# Patient Record
Sex: FEMALE | Race: WHITE | NOT HISPANIC OR LATINO | Employment: OTHER | URBAN - METROPOLITAN AREA
[De-identification: names, ages, dates, MRNs, and addresses within clinical notes are randomized per-mention and may not be internally consistent; named-entity substitution may affect disease eponyms.]

---

## 2023-04-27 LAB
6-ACETYLMORPHINE: <25 NG/ML
7-AMINOCLONAZEPAM: <25 NG/ML
ALPHA-HYDROXYALPRAZOLAM: <25 NG/ML
ALPHA-HYDROXYMIDAZOLAM: <25 NG/ML
ALPRAZOLAM: <25 NG/ML
AMPHETAMINE (PRESENCE) IN URINE BY SCREEN METHOD: ABNORMAL
BARBITURATES PRESENCE IN URINE BY SCREEN METHOD: ABNORMAL
CANNABINOIDS IN URINE BY SCREEN METHOD: ABNORMAL
CHLORDIAZEPOXIDE: <25 NG/ML
CLONAZEPAM: <25 NG/ML
COCAINE (PRESENCE) IN URINE BY SCREEN METHOD: ABNORMAL
CODEINE: <50 NG/ML
CREATINE, URINE FOR DRUG: 254.1 MG/DL
DIAZEPAM: <25 NG/ML
DRUG SCREEN COMMENT URINE: ABNORMAL
EDDP: <25 NG/ML
FENTANYL CONFIRMATION, URINE: <2.5 NG/ML
HYDROCODONE: <25 NG/ML
HYDROMORPHONE: <25 NG/ML
LORAZEPAM: <25 NG/ML
METHADONE CONFIRMATION,URINE: <25 NG/ML
MIDAZOLAM: <25 NG/ML
MORPHINE URINE: <50 NG/ML
NORDIAZEPAM: <25 NG/ML
NORFENTANYL: <2.5 NG/ML
NORHYDROCODONE: <25 NG/ML
NOROXYCODONE: <25 NG/ML
O-DESMETHYLTRAMADOL: <50 NG/ML
OXAZEPAM: <25 NG/ML
OXYCODONE: <25 NG/ML
OXYMORPHONE: <25 NG/ML
PHENCYCLIDINE (PRESENCE) IN URINE BY SCREEN METHOD: ABNORMAL
TEMAZEPAM: <25 NG/ML
TRAMADOL: <50 NG/ML
ZOLPIDEM METABOLITE (ZCA): >1000 NG/ML
ZOLPIDEM: <25 NG/ML

## 2023-09-22 PROBLEM — M41.80 LEVOSCOLIOSIS: Status: ACTIVE | Noted: 2023-09-22

## 2023-09-22 PROBLEM — S46.819A STRAIN OF TRAPEZIUS MUSCLE: Status: ACTIVE | Noted: 2023-09-22

## 2023-09-22 PROBLEM — S39.012A LUMBOSACRAL STRAIN: Status: ACTIVE | Noted: 2023-09-22

## 2023-09-22 PROBLEM — I20.9 ANGINA PECTORIS (CMS-HCC): Status: ACTIVE | Noted: 2023-09-22

## 2023-09-22 PROBLEM — G47.8 SLEEP PARALYSIS: Status: ACTIVE | Noted: 2023-09-22

## 2023-09-22 PROBLEM — G47.00 INSOMNIA: Status: ACTIVE | Noted: 2023-09-22

## 2023-09-22 PROBLEM — M21.70 LEG LENGTH DISCREPANCY: Status: ACTIVE | Noted: 2023-09-22

## 2023-09-22 PROBLEM — M51.26 LUMBAR DISC HERNIATION: Status: ACTIVE | Noted: 2023-09-22

## 2023-09-22 PROBLEM — G89.29 OTHER CHRONIC PAIN: Status: ACTIVE | Noted: 2023-09-22

## 2023-09-22 PROBLEM — F43.9 STRESS: Status: ACTIVE | Noted: 2023-09-22

## 2023-09-22 PROBLEM — M24.559 HIP FLEXOR TIGHTNESS: Status: ACTIVE | Noted: 2023-09-22

## 2023-09-22 PROBLEM — M16.0 OSTEOARTHRITIS OF BOTH HIPS: Status: ACTIVE | Noted: 2023-09-22

## 2023-09-22 PROBLEM — M51.9 DISORDER OF INTERVERTEBRAL DISC OF LUMBAR SPINE: Status: ACTIVE | Noted: 2023-09-22

## 2023-09-22 PROBLEM — R06.02 SHORTNESS OF BREATH: Status: ACTIVE | Noted: 2023-09-22

## 2023-09-22 PROBLEM — N80.9 ENDOMETRIOSIS: Status: ACTIVE | Noted: 2023-09-22

## 2023-09-22 PROBLEM — I42.9 CARDIOMYOPATHY (MULTI): Status: ACTIVE | Noted: 2023-09-22

## 2023-09-22 PROBLEM — M51.369 DEGENERATIVE DISC DISEASE, LUMBAR: Status: ACTIVE | Noted: 2023-09-22

## 2023-09-22 PROBLEM — K52.9 COLITIS: Status: ACTIVE | Noted: 2023-09-22

## 2023-09-22 PROBLEM — K57.32 DIVERTICULITIS LARGE INTESTINE W/O PERFORATION OR ABSCESS W/O BLEEDING: Status: ACTIVE | Noted: 2023-09-22

## 2023-09-22 PROBLEM — S63.509A SPRAIN OF WRIST: Status: ACTIVE | Noted: 2023-09-22

## 2023-09-22 PROBLEM — G25.81 RESTLESS LEG SYNDROME: Status: ACTIVE | Noted: 2023-09-22

## 2023-09-22 PROBLEM — E78.49 OTHER HYPERLIPIDEMIA: Status: ACTIVE | Noted: 2023-09-22

## 2023-09-22 PROBLEM — M51.36 DEGENERATIVE DISC DISEASE, LUMBAR: Status: ACTIVE | Noted: 2023-09-22

## 2023-09-22 PROBLEM — G47.26 SHIFT WORK SLEEP DISORDER: Status: ACTIVE | Noted: 2023-09-22

## 2023-09-22 PROBLEM — G44.89 OTHER HEADACHE SYNDROME: Status: ACTIVE | Noted: 2023-09-22

## 2023-09-22 PROBLEM — R79.0 LOW FERRITIN: Status: ACTIVE | Noted: 2023-09-22

## 2023-09-22 PROBLEM — F43.20 ADJUSTMENT DISORDER: Status: ACTIVE | Noted: 2023-09-22

## 2023-09-22 PROBLEM — M21.079 EVERSION DEFORMITY OF FOOT: Status: ACTIVE | Noted: 2023-09-22

## 2023-09-22 PROBLEM — N91.1 SECONDARY AMENORRHEA: Status: ACTIVE | Noted: 2023-09-22

## 2023-09-22 PROBLEM — G43.909 MIGRAINE: Status: ACTIVE | Noted: 2023-09-22

## 2023-09-22 PROBLEM — M62.89 HAMSTRING TIGHTNESS: Status: ACTIVE | Noted: 2023-09-22

## 2023-09-22 PROBLEM — R41.840 DIFFICULTY CONCENTRATING: Status: ACTIVE | Noted: 2023-09-22

## 2023-09-22 PROBLEM — E55.9 VITAMIN D DEFICIENCY: Status: ACTIVE | Noted: 2023-09-22

## 2023-09-22 PROBLEM — K58.0 IRRITABLE BOWEL SYNDROME WITH DIARRHEA: Status: ACTIVE | Noted: 2023-09-22

## 2023-09-22 PROBLEM — R00.2 PALPITATIONS: Status: ACTIVE | Noted: 2023-09-22

## 2023-09-22 PROBLEM — M54.10 RADICULITIS: Status: ACTIVE | Noted: 2023-09-22

## 2023-09-22 PROBLEM — B00.9 HERPESVIRUS INFECTION: Status: ACTIVE | Noted: 2023-09-22

## 2023-09-22 PROBLEM — T17.208A FOREIGN BODY IN PHARYNX: Status: ACTIVE | Noted: 2023-09-22

## 2023-09-22 PROBLEM — F41.9 ANXIETY: Status: ACTIVE | Noted: 2023-09-22

## 2023-09-22 PROBLEM — G54.9 NERVE ROOT COMPRESSION: Status: ACTIVE | Noted: 2023-09-22

## 2023-09-22 PROBLEM — S20.219A CONTUSION OF CHEST: Status: ACTIVE | Noted: 2023-09-22

## 2023-09-22 PROBLEM — B94.8 SEQUELAE OF OTHER SPECIFIED INFECTIOUS AND PARASITIC DISEASES: Status: ACTIVE | Noted: 2023-09-22

## 2023-09-22 RX ORDER — ONDANSETRON 4 MG/1
TABLET, ORALLY DISINTEGRATING ORAL
COMMUNITY
Start: 2022-11-03 | End: 2023-11-07 | Stop reason: ALTCHOICE

## 2023-09-22 RX ORDER — AMITRIPTYLINE HYDROCHLORIDE 10 MG/1
1-2 TABLET, FILM COATED ORAL NIGHTLY PRN
COMMUNITY
End: 2023-11-07 | Stop reason: SDUPTHER

## 2023-09-22 RX ORDER — CHOLECALCIFEROL (VITAMIN D3) 50 MCG
1 TABLET ORAL DAILY
COMMUNITY

## 2023-09-22 RX ORDER — LIDOCAINE 50 MG/G
PATCH TOPICAL DAILY
COMMUNITY
Start: 2021-06-11

## 2023-09-22 RX ORDER — TALC
1 POWDER (GRAM) TOPICAL NIGHTLY PRN
COMMUNITY

## 2023-09-22 RX ORDER — VITAMIN B COMPLEX
1 CAPSULE ORAL DAILY
COMMUNITY

## 2023-09-22 RX ORDER — SUCRALFATE 1 G/10ML
30 SUSPENSION ORAL 4 TIMES DAILY PRN
COMMUNITY

## 2023-09-22 RX ORDER — CLINDAMYCIN PHOSPHATE 1 G/10ML
1 GEL TOPICAL DAILY
COMMUNITY
Start: 2020-01-06

## 2023-09-22 RX ORDER — SPIRONOLACTONE 50 MG/1
3 TABLET, FILM COATED ORAL DAILY
COMMUNITY
End: 2023-11-07 | Stop reason: SDUPTHER

## 2023-09-22 RX ORDER — ACETYLCYSTEINE 600 MG
CAPSULE ORAL
COMMUNITY

## 2023-09-22 RX ORDER — DICYCLOMINE HYDROCHLORIDE 10 MG/1
2 CAPSULE ORAL 3 TIMES DAILY
COMMUNITY

## 2023-09-22 RX ORDER — GABAPENTIN 300 MG/1
1 CAPSULE ORAL 3 TIMES DAILY
COMMUNITY
End: 2023-12-01 | Stop reason: SDUPTHER

## 2023-09-22 RX ORDER — ZOLPIDEM TARTRATE 10 MG/1
1 TABLET ORAL NIGHTLY PRN
COMMUNITY
Start: 2022-08-17 | End: 2023-10-18 | Stop reason: SDUPTHER

## 2023-10-05 ENCOUNTER — PHARMACY VISIT (OUTPATIENT)
Dept: PHARMACY | Facility: CLINIC | Age: 38
End: 2023-10-05
Payer: COMMERCIAL

## 2023-10-05 PROCEDURE — RXMED WILLOW AMBULATORY MEDICATION CHARGE

## 2023-10-18 ENCOUNTER — PHARMACY VISIT (OUTPATIENT)
Dept: PHARMACY | Facility: CLINIC | Age: 38
End: 2023-10-18
Payer: COMMERCIAL

## 2023-10-18 DIAGNOSIS — F51.01 PRIMARY INSOMNIA: ICD-10-CM

## 2023-10-18 PROCEDURE — RXMED WILLOW AMBULATORY MEDICATION CHARGE

## 2023-10-18 RX ORDER — ZOLPIDEM TARTRATE 10 MG/1
10 TABLET ORAL NIGHTLY PRN
Qty: 30 TABLET | Refills: 0 | Status: SHIPPED | OUTPATIENT
Start: 2023-10-18 | End: 2023-11-01 | Stop reason: SDUPTHER

## 2023-10-18 NOTE — TELEPHONE ENCOUNTER
Patient called requesting refill of Ambien. FUV 10/23/23 - will not have enough medication until then.     #30 for 30 days sent to pharmacy on file.

## 2023-10-19 RX ORDER — ZOLPIDEM TARTRATE 10 MG/1
10 TABLET ORAL NIGHTLY PRN
Qty: 30 TABLET | Refills: 2 | OUTPATIENT
Start: 2023-10-19 | End: 2024-04-16

## 2023-10-23 ENCOUNTER — APPOINTMENT (OUTPATIENT)
Dept: SLEEP MEDICINE | Facility: CLINIC | Age: 38
End: 2023-10-23
Payer: COMMERCIAL

## 2023-10-24 ENCOUNTER — TELEPHONE (OUTPATIENT)
Dept: PRIMARY CARE | Facility: CLINIC | Age: 38
End: 2023-10-24
Payer: COMMERCIAL

## 2023-10-24 NOTE — TELEPHONE ENCOUNTER
PT was scheduled for physical on 10/24/2023, was marked as a no show as PT was4 minutes late for appointment. PT has rescheduled her physical for 11/7/2023, but she needs a form for her employer stating she is exempt from the flu shot. Please advise. [phone]

## 2023-10-31 NOTE — PROGRESS NOTES
"This is a 38 year old female for ROUTINE MEDICAL        Subjective   Leeann Mark is a 38 y.o. female who presents for Annual Exam.    HPI:        Review of systems is essentially negative for all systems except for any identified issues in HPI above.    Objective     /60   Pulse 91   Temp 36.7 °C (98 °F)   Ht 1.6 m (5' 3\")   Wt 70.8 kg (156 lb)   SpO2 98%   BMI 27.63 kg/m²      COMPLETE PHYSICAL EXAM    GENERAL           General Appearance: pleasant, well-appearing, well-developed, well-hydrated, well-nourished, well-groomed, .        HEENT           NECK supple, Neg for adneopathy no thyroid enlargement or nodules, Oropharynx normal no exudates.        EYES           Pupils: PERRLA, no photophobia.        HEART           Rate and Rhythm regular rate and rhythm. Heart sounds: normal S1S2. Murmurs: none.        LUNGS           Effort: Normal chest wall, no pectus, Normal air entry all fields, Clear to IPPA, RR<16 with no use of accessory muscles.        BREASTS           Bilaterally: no masses, no nipple retraction, no nipple discharge, no abnormal skin changes. Axilla: no lymphadenopathy.        BACK           General: unremarkable, no spinal tenderness or rashes.        ABDOMEN           General: Normal to inspection, neg for LKKS or masses and BSs heard in all quadrants       LYMPHATICS           Cervical: none. Axillary: none.        MUSCULOSKELETAL           gross abnormalities no gross abnormalities, no joint redness or swelling.        EXTREMITIES           Varicose veins: not present. Pulses: 2+ bilateral. Clubbing: none. Cyanosis: no.        NEUROLOGICAL           Orientation: alert and oriented x 3. Grossly normal: yes. Plantars: downgoing bilaterally. Muscle Bulk: normal . Cranial Nerves: CN's II-XII grossly intact.        PSYCHOLOGY           Affect: appropriate. Mood: pleasant.     Assessment/Plan   Problem List Items Addressed This Visit    None  Visit Diagnoses       Routine medical exam  "   -  Primary    Screening, lipid        Health counseling        Immunization counseling                FOLLOW UP:   YEARLY for ROUTINE MEDICAL and PRN for other issues as discussed in visit         Sil Hackett M.D. ROS is NEG for HEADACHE, NAUSEA, VOMITING, DIARRHEA, CHEST PAIN, SOB, and BLEEDING and as further REVIEWED BELOW.

## 2023-11-01 ENCOUNTER — PHARMACY VISIT (OUTPATIENT)
Dept: PHARMACY | Facility: CLINIC | Age: 38
End: 2023-11-01
Payer: COMMERCIAL

## 2023-11-01 DIAGNOSIS — F51.01 PRIMARY INSOMNIA: ICD-10-CM

## 2023-11-01 PROCEDURE — RXMED WILLOW AMBULATORY MEDICATION CHARGE

## 2023-11-02 RX ORDER — ZOLPIDEM TARTRATE 10 MG/1
10 TABLET ORAL NIGHTLY PRN
Qty: 30 TABLET | Refills: 0 | Status: SHIPPED | OUTPATIENT
Start: 2023-11-02 | End: 2023-11-30 | Stop reason: SDUPTHER

## 2023-11-07 ENCOUNTER — OFFICE VISIT (OUTPATIENT)
Dept: PRIMARY CARE | Facility: CLINIC | Age: 38
End: 2023-11-07
Payer: COMMERCIAL

## 2023-11-07 ENCOUNTER — PHARMACY VISIT (OUTPATIENT)
Dept: PHARMACY | Facility: CLINIC | Age: 38
End: 2023-11-07
Payer: COMMERCIAL

## 2023-11-07 VITALS
BODY MASS INDEX: 27.64 KG/M2 | DIASTOLIC BLOOD PRESSURE: 60 MMHG | WEIGHT: 156 LBS | OXYGEN SATURATION: 98 % | TEMPERATURE: 98 F | HEIGHT: 63 IN | SYSTOLIC BLOOD PRESSURE: 118 MMHG | HEART RATE: 91 BPM

## 2023-11-07 DIAGNOSIS — Z71.85 IMMUNIZATION COUNSELING: ICD-10-CM

## 2023-11-07 DIAGNOSIS — Z00.00 ROUTINE MEDICAL EXAM: Primary | ICD-10-CM

## 2023-11-07 DIAGNOSIS — Z13.220 SCREENING, LIPID: ICD-10-CM

## 2023-11-07 DIAGNOSIS — L70.9 ACNE, UNSPECIFIED ACNE TYPE: ICD-10-CM

## 2023-11-07 DIAGNOSIS — Z71.9 HEALTH COUNSELING: ICD-10-CM

## 2023-11-07 PROCEDURE — RXMED WILLOW AMBULATORY MEDICATION CHARGE

## 2023-11-07 PROCEDURE — 99395 PREV VISIT EST AGE 18-39: CPT | Performed by: FAMILY MEDICINE

## 2023-11-07 PROCEDURE — 1036F TOBACCO NON-USER: CPT | Performed by: FAMILY MEDICINE

## 2023-11-07 RX ORDER — SPIRONOLACTONE 50 MG/1
150 TABLET, FILM COATED ORAL DAILY
Qty: 90 TABLET | Refills: 0 | Status: SHIPPED | OUTPATIENT
Start: 2023-11-07 | End: 2023-11-30 | Stop reason: SDUPTHER

## 2023-11-07 ASSESSMENT — PATIENT HEALTH QUESTIONNAIRE - PHQ9
SUM OF ALL RESPONSES TO PHQ9 QUESTIONS 1 AND 2: 0
1. LITTLE INTEREST OR PLEASURE IN DOING THINGS: NOT AT ALL
2. FEELING DOWN, DEPRESSED OR HOPELESS: NOT AT ALL

## 2023-11-07 ASSESSMENT — PAIN SCALES - GENERAL: PAINLEVEL: 3

## 2023-11-13 ENCOUNTER — PHARMACY VISIT (OUTPATIENT)
Dept: PHARMACY | Facility: CLINIC | Age: 38
End: 2023-11-13
Payer: COMMERCIAL

## 2023-11-13 PROCEDURE — RXMED WILLOW AMBULATORY MEDICATION CHARGE

## 2023-11-17 ENCOUNTER — LAB (OUTPATIENT)
Dept: LAB | Facility: LAB | Age: 38
End: 2023-11-17
Payer: COMMERCIAL

## 2023-11-17 DIAGNOSIS — Z00.00 ROUTINE MEDICAL EXAM: ICD-10-CM

## 2023-11-17 DIAGNOSIS — Z13.220 SCREENING, LIPID: ICD-10-CM

## 2023-11-17 LAB
ALBUMIN SERPL-MCNC: 4.6 G/DL (ref 3.5–5)
ALP BLD-CCNC: 84 U/L (ref 35–125)
ALT SERPL-CCNC: 19 U/L (ref 5–40)
ANION GAP SERPL CALC-SCNC: 14 MMOL/L
APPEARANCE UR: CLEAR
AST SERPL-CCNC: 18 U/L (ref 5–40)
BACTERIA #/AREA URNS AUTO: ABNORMAL /HPF
BILIRUB SERPL-MCNC: 0.4 MG/DL (ref 0.1–1.2)
BILIRUB UR STRIP.AUTO-MCNC: NEGATIVE MG/DL
BUN SERPL-MCNC: 8 MG/DL (ref 8–25)
CALCIUM SERPL-MCNC: 9.6 MG/DL (ref 8.5–10.4)
CHLORIDE SERPL-SCNC: 101 MMOL/L (ref 97–107)
CHOLEST SERPL-MCNC: 177 MG/DL (ref 133–200)
CHOLEST/HDLC SERPL: 3.1 {RATIO}
CO2 SERPL-SCNC: 24 MMOL/L (ref 24–31)
COLOR UR: YELLOW
CREAT SERPL-MCNC: 0.8 MG/DL (ref 0.4–1.6)
ERYTHROCYTE [DISTWIDTH] IN BLOOD BY AUTOMATED COUNT: 12 % (ref 11.5–14.5)
GFR SERPL CREATININE-BSD FRML MDRD: >90 ML/MIN/1.73M*2
GLUCOSE SERPL-MCNC: 96 MG/DL (ref 65–99)
GLUCOSE UR STRIP.AUTO-MCNC: NORMAL MG/DL
HCT VFR BLD AUTO: 40.2 % (ref 36–46)
HDLC SERPL-MCNC: 57 MG/DL
HGB BLD-MCNC: 13.4 G/DL (ref 12–16)
HOLD SPECIMEN: NORMAL
KETONES UR STRIP.AUTO-MCNC: NEGATIVE MG/DL
LDLC SERPL CALC-MCNC: 103 MG/DL (ref 65–130)
LEUKOCYTE ESTERASE UR QL STRIP.AUTO: NEGATIVE
MCH RBC QN AUTO: 29.1 PG (ref 26–34)
MCHC RBC AUTO-ENTMCNC: 33.3 G/DL (ref 32–36)
MCV RBC AUTO: 87 FL (ref 80–100)
NITRITE UR QL STRIP.AUTO: NEGATIVE
NRBC BLD-RTO: 0 /100 WBCS (ref 0–0)
PH UR STRIP.AUTO: 5 [PH]
PLATELET # BLD AUTO: 298 X10*3/UL (ref 150–450)
POTASSIUM SERPL-SCNC: 4.1 MMOL/L (ref 3.4–5.1)
PROT SERPL-MCNC: 7.3 G/DL (ref 5.9–7.9)
PROT UR STRIP.AUTO-MCNC: NEGATIVE MG/DL
RBC # BLD AUTO: 4.6 X10*6/UL (ref 4–5.2)
RBC # UR STRIP.AUTO: ABNORMAL /UL
RBC #/AREA URNS AUTO: ABNORMAL /HPF
SODIUM SERPL-SCNC: 139 MMOL/L (ref 133–145)
SP GR UR STRIP.AUTO: 1.02
SQUAMOUS #/AREA URNS AUTO: ABNORMAL /HPF
TRIGL SERPL-MCNC: 86 MG/DL (ref 40–150)
UROBILINOGEN UR STRIP.AUTO-MCNC: NORMAL MG/DL
WBC # BLD AUTO: 8.5 X10*3/UL (ref 4.4–11.3)
WBC #/AREA URNS AUTO: ABNORMAL /HPF

## 2023-11-17 PROCEDURE — 80061 LIPID PANEL: CPT

## 2023-11-17 PROCEDURE — 81001 URINALYSIS AUTO W/SCOPE: CPT

## 2023-11-17 PROCEDURE — 80053 COMPREHEN METABOLIC PANEL: CPT

## 2023-11-17 PROCEDURE — 85027 COMPLETE CBC AUTOMATED: CPT

## 2023-11-17 PROCEDURE — 36415 COLL VENOUS BLD VENIPUNCTURE: CPT

## 2023-11-20 ENCOUNTER — TELEPHONE (OUTPATIENT)
Dept: PRIMARY CARE | Facility: CLINIC | Age: 38
End: 2023-11-20
Payer: COMMERCIAL

## 2023-11-20 NOTE — TELEPHONE ENCOUNTER
Pt left VM stating that she received a message on  [facility] to call the office to go over recent urine test results.

## 2023-11-30 ENCOUNTER — PHARMACY VISIT (OUTPATIENT)
Dept: PHARMACY | Facility: CLINIC | Age: 38
End: 2023-11-30
Payer: COMMERCIAL

## 2023-11-30 DIAGNOSIS — G89.29 OTHER CHRONIC PAIN: Primary | ICD-10-CM

## 2023-11-30 DIAGNOSIS — L70.9 ACNE, UNSPECIFIED ACNE TYPE: ICD-10-CM

## 2023-11-30 DIAGNOSIS — F51.01 PRIMARY INSOMNIA: ICD-10-CM

## 2023-11-30 PROCEDURE — RXMED WILLOW AMBULATORY MEDICATION CHARGE

## 2023-11-30 RX ORDER — ZOLPIDEM TARTRATE 10 MG/1
10 TABLET ORAL NIGHTLY PRN
Qty: 30 TABLET | Refills: 0 | Status: SHIPPED | OUTPATIENT
Start: 2023-11-30 | End: 2024-01-05 | Stop reason: SDUPTHER

## 2023-11-30 RX ORDER — SPIRONOLACTONE 50 MG/1
150 TABLET, FILM COATED ORAL DAILY
Qty: 90 TABLET | Refills: 0 | Status: SHIPPED | OUTPATIENT
Start: 2023-11-30 | End: 2023-12-26 | Stop reason: SDUPTHER

## 2023-11-30 NOTE — TELEPHONE ENCOUNTER
"ANN CALLED REQUESTING GABAPENTIN REFILL 300MGM TID.  LAST VISIT 9/18/23 WITH NO FOLLOW UP APPT. SCHEDULED.  WHEN I ATTEMPTED TO GET THE PT TO MAKE A FOLLOW UP APPOINTMENT SHE STATED, \"DR. THOMAS SAID  HE WOULD SEE ME IN 1 YEAR\". NO APPOINTMENT MADE.  PLEASE ADVISE  "

## 2023-12-01 ENCOUNTER — PHARMACY VISIT (OUTPATIENT)
Dept: PHARMACY | Facility: CLINIC | Age: 38
End: 2023-12-01
Payer: COMMERCIAL

## 2023-12-01 DIAGNOSIS — Z76.0 MEDICATION REFILL: ICD-10-CM

## 2023-12-01 PROCEDURE — RXMED WILLOW AMBULATORY MEDICATION CHARGE

## 2023-12-01 RX ORDER — GABAPENTIN 300 MG/1
300 CAPSULE ORAL 3 TIMES DAILY
Qty: 90 CAPSULE | Refills: 0 | Status: SHIPPED | OUTPATIENT
Start: 2023-12-01 | End: 2023-12-31

## 2023-12-01 RX ORDER — GABAPENTIN 300 MG/1
300 CAPSULE ORAL 3 TIMES DAILY
Qty: 90 CAPSULE | Refills: 2 | Status: SHIPPED | OUTPATIENT
Start: 2023-12-01 | End: 2024-05-23 | Stop reason: SDUPTHER

## 2023-12-01 RX ORDER — GABAPENTIN 300 MG/1
300 CAPSULE ORAL 3 TIMES DAILY
Qty: 90 CAPSULE | Refills: 4 | Status: SHIPPED | OUTPATIENT
Start: 2023-12-01 | End: 2024-11-30

## 2023-12-07 PROCEDURE — RXMED WILLOW AMBULATORY MEDICATION CHARGE

## 2023-12-09 PROCEDURE — RXMED WILLOW AMBULATORY MEDICATION CHARGE

## 2023-12-11 ENCOUNTER — PHARMACY VISIT (OUTPATIENT)
Dept: PHARMACY | Facility: CLINIC | Age: 38
End: 2023-12-11
Payer: COMMERCIAL

## 2023-12-26 DIAGNOSIS — L70.9 ACNE, UNSPECIFIED ACNE TYPE: ICD-10-CM

## 2023-12-26 PROCEDURE — RXMED WILLOW AMBULATORY MEDICATION CHARGE

## 2023-12-26 RX ORDER — SPIRONOLACTONE 50 MG/1
150 TABLET, FILM COATED ORAL DAILY
Qty: 90 TABLET | Refills: 0 | Status: SHIPPED | OUTPATIENT
Start: 2023-12-26 | End: 2024-02-21 | Stop reason: SDUPTHER

## 2023-12-27 ENCOUNTER — PHARMACY VISIT (OUTPATIENT)
Dept: PHARMACY | Facility: CLINIC | Age: 38
End: 2023-12-27
Payer: COMMERCIAL

## 2024-01-05 DIAGNOSIS — F51.01 PRIMARY INSOMNIA: ICD-10-CM

## 2024-01-08 ENCOUNTER — PHARMACY VISIT (OUTPATIENT)
Dept: PHARMACY | Facility: CLINIC | Age: 39
End: 2024-01-08
Payer: COMMERCIAL

## 2024-01-08 PROCEDURE — RXMED WILLOW AMBULATORY MEDICATION CHARGE

## 2024-01-08 RX ORDER — ZOLPIDEM TARTRATE 10 MG/1
10 TABLET ORAL NIGHTLY PRN
Qty: 30 TABLET | Refills: 0 | Status: SHIPPED | OUTPATIENT
Start: 2024-01-08 | End: 2024-02-06 | Stop reason: SDUPTHER

## 2024-01-22 NOTE — PROGRESS NOTES
This is a 38 year old female for FU CONDITIONS and COMPLETION/UPDATE [facility] PPWK and doing reasonably well in care of PAIN MGT and SLEEP MEDICINE for controlled substances.    Some weight gain and had TSH wnl recenlty.      ROS is NEG for HEADACHE, NAUSEA, VOMITING, DIARRHEA, CHEST PAIN, SOB, and BLEEDING and as further REVIEWED BELOW.    Subjective   Leeann Mark is a 38 y.o. female who presents for FU CHRONIC CONDITIONS and [facility]    HPI:    See above    Review of systems is essentially negative for all systems except for any identified issues in HPI above.    Objective     /80   Pulse 94   Temp 36.3 °C (97.4 °F)   Wt 70.8 kg (156 lb)   SpO2 98%   BMI 27.63 kg/m²      Physical Examination:       GENERAL           General Appearance: well-appearing, well-developed, well-hydrated, well-nourished, no acute distress.        HEENT           NECK supple, no masses or thyromegaly, no carotid bruit.        EYES           Extraocular Movements: normal, bilateral eyes SAMIR, no conjunctival injection.        HEART           Rate and Rhythm regular rate and rhythm. Heart sounds: normal S1S2, no S3 or S4. Murmurs: none.        CHEST           Breath sounds: Clear to IPPA, RR<16 no use of accessory muscles.        ABDOMEN           General: Neg for LKKS or masses, no scleral icterus or jaundice.        MUSCULOSKELETAL           Joints Demonstration: Neg for erythema, swelling or joint deformities. gross abnormalities no gross abnormalities.        EXTREMITIES           Lower Extremities: Neg for cyanosis, clubbing or edema.       Assessment/Plan   Problem List Items Addressed This Visit    None  Visit Diagnoses       Immunization counseling    -  Primary    Health counseling                FOLLOW UP:  PRN and as specified above         Sil Hackett M.D.

## 2024-01-23 PROCEDURE — RXMED WILLOW AMBULATORY MEDICATION CHARGE

## 2024-01-26 ENCOUNTER — PHARMACY VISIT (OUTPATIENT)
Dept: PHARMACY | Facility: CLINIC | Age: 39
End: 2024-01-26
Payer: COMMERCIAL

## 2024-01-26 ENCOUNTER — OFFICE VISIT (OUTPATIENT)
Dept: PRIMARY CARE | Facility: CLINIC | Age: 39
End: 2024-01-26
Payer: COMMERCIAL

## 2024-01-26 VITALS
WEIGHT: 156 LBS | TEMPERATURE: 97.4 F | OXYGEN SATURATION: 98 % | BODY MASS INDEX: 27.63 KG/M2 | DIASTOLIC BLOOD PRESSURE: 80 MMHG | SYSTOLIC BLOOD PRESSURE: 126 MMHG | HEART RATE: 94 BPM

## 2024-01-26 DIAGNOSIS — Z71.85 IMMUNIZATION COUNSELING: Primary | ICD-10-CM

## 2024-01-26 DIAGNOSIS — Z71.9 HEALTH COUNSELING: ICD-10-CM

## 2024-01-26 PROCEDURE — 1036F TOBACCO NON-USER: CPT | Performed by: FAMILY MEDICINE

## 2024-01-26 PROCEDURE — 99213 OFFICE O/P EST LOW 20 MIN: CPT | Performed by: FAMILY MEDICINE

## 2024-01-26 RX ORDER — HYDROXYZINE PAMOATE 50 MG/1
CAPSULE ORAL
COMMUNITY
End: 2024-02-19 | Stop reason: ALTCHOICE

## 2024-01-26 ASSESSMENT — PATIENT HEALTH QUESTIONNAIRE - PHQ9
SUM OF ALL RESPONSES TO PHQ9 QUESTIONS 1 AND 2: 0
2. FEELING DOWN, DEPRESSED OR HOPELESS: NOT AT ALL
1. LITTLE INTEREST OR PLEASURE IN DOING THINGS: NOT AT ALL

## 2024-01-26 ASSESSMENT — PAIN SCALES - GENERAL: PAINLEVEL: 3

## 2024-02-06 DIAGNOSIS — F51.01 PRIMARY INSOMNIA: ICD-10-CM

## 2024-02-07 ENCOUNTER — PHARMACY VISIT (OUTPATIENT)
Dept: PHARMACY | Facility: CLINIC | Age: 39
End: 2024-02-07
Payer: COMMERCIAL

## 2024-02-07 PROCEDURE — RXMED WILLOW AMBULATORY MEDICATION CHARGE

## 2024-02-07 RX ORDER — ZOLPIDEM TARTRATE 10 MG/1
10 TABLET ORAL NIGHTLY PRN
Qty: 30 TABLET | Refills: 0 | Status: SHIPPED | OUTPATIENT
Start: 2024-02-07 | End: 2024-02-19 | Stop reason: SDUPTHER

## 2024-02-19 ENCOUNTER — LAB (OUTPATIENT)
Dept: LAB | Facility: LAB | Age: 39
End: 2024-02-19
Payer: COMMERCIAL

## 2024-02-19 ENCOUNTER — OFFICE VISIT (OUTPATIENT)
Dept: SLEEP MEDICINE | Facility: CLINIC | Age: 39
End: 2024-02-19
Payer: COMMERCIAL

## 2024-02-19 VITALS
DIASTOLIC BLOOD PRESSURE: 84 MMHG | OXYGEN SATURATION: 97 % | HEART RATE: 91 BPM | WEIGHT: 154 LBS | BODY MASS INDEX: 27.29 KG/M2 | HEIGHT: 63 IN | SYSTOLIC BLOOD PRESSURE: 116 MMHG

## 2024-02-19 DIAGNOSIS — F51.01 PRIMARY INSOMNIA: ICD-10-CM

## 2024-02-19 DIAGNOSIS — G47.26 SHIFT WORK SLEEP DISORDER: ICD-10-CM

## 2024-02-19 DIAGNOSIS — Z79.899 MEDICATION MANAGEMENT: Primary | ICD-10-CM

## 2024-02-19 DIAGNOSIS — Z79.899 MEDICATION MANAGEMENT: ICD-10-CM

## 2024-02-19 LAB
AMPHETAMINES UR QL SCN: NORMAL
BARBITURATES UR QL SCN: NORMAL
BENZODIAZ UR QL SCN: NORMAL
BZE UR QL SCN: NORMAL
CANNABINOIDS UR QL SCN: NORMAL
FENTANYL+NORFENTANYL UR QL SCN: NORMAL
OPIATES UR QL SCN: NORMAL
OXYCODONE+OXYMORPHONE UR QL SCN: NORMAL
PCP UR QL SCN: NORMAL

## 2024-02-19 PROCEDURE — 80368 SEDATIVE HYPNOTICS: CPT

## 2024-02-19 PROCEDURE — 80307 DRUG TEST PRSMV CHEM ANLYZR: CPT

## 2024-02-19 PROCEDURE — 1036F TOBACCO NON-USER: CPT | Performed by: INTERNAL MEDICINE

## 2024-02-19 PROCEDURE — 99213 OFFICE O/P EST LOW 20 MIN: CPT | Performed by: INTERNAL MEDICINE

## 2024-02-19 RX ORDER — ZOLPIDEM TARTRATE 10 MG/1
10 TABLET ORAL NIGHTLY PRN
Qty: 30 TABLET | Refills: 2 | Status: SHIPPED | OUTPATIENT
Start: 2024-02-19 | End: 2024-05-23 | Stop reason: SDUPTHER

## 2024-02-19 ASSESSMENT — SLEEP AND FATIGUE QUESTIONNAIRES
HOW LIKELY ARE YOU TO NOD OFF OR FALL ASLEEP WHILE SITTING QUIETLY AFTER LUNCH WITHOUT ALCOHOL: WOULD NEVER DOZE
HOW LIKELY ARE YOU TO NOD OFF OR FALL ASLEEP WHILE SITTING AND TALKING TO SOMEONE: WOULD NEVER DOZE
ESS-CHAD TOTAL SCORE: 2
HOW LIKELY ARE YOU TO NOD OFF OR FALL ASLEEP WHEN YOU ARE A PASSENGER IN A CAR FOR AN HOUR WITHOUT A BREAK: SLIGHT CHANCE OF DOZING
HOW LIKELY ARE YOU TO NOD OFF OR FALL ASLEEP IN A CAR, WHILE STOPPED FOR A FEW MINUTES IN TRAFFIC: WOULD NEVER DOZE
HOW LIKELY ARE YOU TO NOD OFF OR FALL ASLEEP WHILE LYING DOWN TO REST IN THE AFTERNOON WHEN CIRCUMSTANCES PERMIT: SLIGHT CHANCE OF DOZING
SITING INACTIVE IN A PUBLIC PLACE LIKE A CLASS ROOM OR A MOVIE THEATER: WOULD NEVER DOZE
HOW LIKELY ARE YOU TO NOD OFF OR FALL ASLEEP WHILE WATCHING TV: WOULD NEVER DOZE
HOW LIKELY ARE YOU TO NOD OFF OR FALL ASLEEP WHILE SITTING AND READING: WOULD NEVER DOZE

## 2024-02-19 ASSESSMENT — PAIN SCALES - GENERAL: PAINLEVEL: 0-NO PAIN

## 2024-02-19 NOTE — PROGRESS NOTES
Subjective   Patient ID: Ann Mark is a 38 y.o. female who presents for Insomnia.  HPI    Prior Sleep History:  PRIMARY SNORING:  ANN had a negative HSAT, AHI 4.1. 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 Make sure your room is dark, quiet, cool and interruptions are eliminated. Avoid light in the mornings, wear dark sunglasses driving home. Expose yourself to bright light or daylight upon waking. Avoid caffeine 8 hours prior to sleeping. We will consider modafinil, a mild stimulant, if these strategies are not effective.    Current Sleep History:  OARRS:  Gerardo Dougherty MD on 2/19/2024  4:01 PM  I have personally reviewed the OARRS report for Ann Mark. I have considered the risks of abuse, dependence, addiction and diversion and I believe that it is clinically appropriate for Ann Mark to be prescribed this medication    Is the patient prescribed a combination of a benzodiazepine and opioid?  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    Last Urine Drug Screen / ordered today: Yes  Recent Results (from the past 8760 hour(s))   OPIATE/OPIOID/BENZO PRESCRIPTION COMPLIANCE    Collection Time: 04/24/23  4:31 PM   Result Value Ref Range    DRUG SCREEN COMMENT URINE SEE BELOW     Creatine, Urine 254.1 mg/dL    Amphetamine Screen, Urine PRESUMPTIVE NEGATIVE NEGATIVE    Barbiturate Screen, Urine PRESUMPTIVE NEGATIVE NEGATIVE    Cannabinoid  Screen, Urine PRESUMPTIVE NEGATIVE NEGATIVE    Cocaine Screen, Urine PRESUMPTIVE NEGATIVE NEGATIVE    PCP Screen, Urine PRESUMPTIVE NEGATIVE NEGATIVE    7-Aminoclonazepam <25 Cutoff <25 ng/mL    Alpha-Hydroxyalprazolam <25 Cutoff <25 ng/mL    Alpha-Hydroxymidazolam <25 Cutoff <25 ng/mL    Alprazolam <25 Cutoff <25 ng/mL    Chlordiazepoxide <25 Cutoff <25 ng/mL    Clonazepam <25 Cutoff <25 ng/mL    Diazepam <25 Cutoff <25 ng/mL    Lorazepam <25 Cutoff <25 ng/mL    Midazolam <25 Cutoff <25 ng/mL    Nordiazepam <25 Cutoff <25 ng/mL    Oxazepam <25 Cutoff <25 ng/mL    Temazepam <25 Cutoff <25 ng/mL    Zolpidem <25 Cutoff <25 ng/mL    Zolpidem Metabolite (ZCA) >1000 (A) Cutoff <25 ng/mL    6-Acetylmorphine <25 Cutoff <25 ng/mL    Codeine <50 Cutoff <50 ng/mL    Hydrocodone <25 Cutoff <25 ng/mL    Hydromorphone <25 Cutoff <25 ng/mL    Morphine Urine <50 Cutoff <50 ng/mL    Norhydrocodone <25 Cutoff <25 ng/mL    Noroxycodone <25 Cutoff <25 ng/mL    Oxycodone <25 Cutoff <25 ng/mL    Oxymorphone <25 Cutoff <25 ng/mL    Tramadol <50 Cutoff <50 ng/mL    O-Desmethyltramadol <50 Cutoff <50 ng/mL    Fentanyl <2.5 Cutoff<2.5 ng/mL    Norfentanyl <2.5 Cutoff<2.5 ng/mL    METHADONE CONFIRMATION,URINE <25 Cutoff <25 ng/mL    EDDP <25 Cutoff <25 ng/mL     Results are as expected.         Controlled Substance Agreement:  Date of the Last Agreement: 02/19/24   Reviewed Controlled Substance Agreement including but not limited to the benefits, risks, and alternatives to treatment with a Controlled Substance medication(s).    Sleep Aids:   What is the patient's goal of therapy? To decrease time to fall asleep due to shift work  Is this being achieved with current treatment? yes    Activities of Daily Living:   Is your overall impression that this patient is benefiting (symptom reduction outweighs side effects) from sleep aid therapy? Yes     1. Physical Functioning: Better  2. Family Relationship: Better  3. Social Relationship: Better  4.  "Mood: Better  5. Sleep Patterns: Better  6. Overall Function: Better       ESS: 2     Review of Systems  Review of systems negative except as per HPI  Objective   /84   Pulse 91   Ht 1.6 m (5' 3\")   Wt 69.9 kg (154 lb)   SpO2 97%   BMI 27.28 kg/m²    PREVIOUS WEIGHTS:  Wt Readings from Last 3 Encounters:   02/19/24 69.9 kg (154 lb)   01/26/24 70.8 kg (156 lb)   11/07/23 70.8 kg (156 lb)     Physical Exam  PHYSICAL EXAM: GENERAL: alert pleasant and cooperative no acute distress  PSYCH EXAM: alert,oriented, in NAD with a full range of affect, normal behavior and no psychotic features    Lab Results   Component Value Date    IRON 128 06/11/2021    TIBC 354 06/11/2021    FERRITIN 24 06/11/2021        Assessment/Plan   Problem List Items Addressed This Visit             ICD-10-CM    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

## 2024-02-19 NOTE — ASSESSMENT & PLAN NOTE
Continues to work 7 PM - 7 AM and benefits from use of zolpidem to assist with getting to sleep on working days

## 2024-02-19 NOTE — ASSESSMENT & PLAN NOTE
Stable on zolpidem, continue current treatment plan. Reviewed controlled substance agreement and ordered UDS to update in records

## 2024-02-21 ENCOUNTER — PHARMACY VISIT (OUTPATIENT)
Dept: PHARMACY | Facility: CLINIC | Age: 39
End: 2024-02-21
Payer: COMMERCIAL

## 2024-02-21 DIAGNOSIS — L70.9 ACNE, UNSPECIFIED ACNE TYPE: ICD-10-CM

## 2024-02-21 PROCEDURE — RXMED WILLOW AMBULATORY MEDICATION CHARGE

## 2024-02-21 RX ORDER — SPIRONOLACTONE 50 MG/1
150 TABLET, FILM COATED ORAL DAILY
Qty: 90 TABLET | Refills: 0 | Status: SHIPPED | OUTPATIENT
Start: 2024-02-21 | End: 2024-03-15 | Stop reason: SDUPTHER

## 2024-02-24 LAB
ZOLPIDEM UR CFM-MCNC: >1000 NG/ML
ZOLPIDEM UR-MCNC: <25 NG/ML

## 2024-02-28 PROCEDURE — RXMED WILLOW AMBULATORY MEDICATION CHARGE

## 2024-03-04 ENCOUNTER — PHARMACY VISIT (OUTPATIENT)
Dept: PHARMACY | Facility: CLINIC | Age: 39
End: 2024-03-04
Payer: COMMERCIAL

## 2024-03-07 ENCOUNTER — PHARMACY VISIT (OUTPATIENT)
Dept: PHARMACY | Facility: CLINIC | Age: 39
End: 2024-03-07
Payer: COMMERCIAL

## 2024-03-07 PROCEDURE — RXMED WILLOW AMBULATORY MEDICATION CHARGE

## 2024-03-15 DIAGNOSIS — L70.9 ACNE, UNSPECIFIED ACNE TYPE: ICD-10-CM

## 2024-03-16 PROCEDURE — RXMED WILLOW AMBULATORY MEDICATION CHARGE

## 2024-03-18 ENCOUNTER — PHARMACY VISIT (OUTPATIENT)
Dept: PHARMACY | Facility: CLINIC | Age: 39
End: 2024-03-18
Payer: COMMERCIAL

## 2024-03-18 PROCEDURE — RXMED WILLOW AMBULATORY MEDICATION CHARGE

## 2024-03-18 RX ORDER — SPIRONOLACTONE 50 MG/1
150 TABLET, FILM COATED ORAL DAILY
Qty: 90 TABLET | Refills: 0 | Status: SHIPPED | OUTPATIENT
Start: 2024-03-18 | End: 2024-04-23 | Stop reason: SDUPTHER

## 2024-03-25 ENCOUNTER — APPOINTMENT (OUTPATIENT)
Dept: SLEEP MEDICINE | Facility: CLINIC | Age: 39
End: 2024-03-25
Payer: COMMERCIAL

## 2024-04-04 PROCEDURE — RXMED WILLOW AMBULATORY MEDICATION CHARGE

## 2024-04-05 ENCOUNTER — PHARMACY VISIT (OUTPATIENT)
Dept: PHARMACY | Facility: CLINIC | Age: 39
End: 2024-04-05
Payer: COMMERCIAL

## 2024-04-08 ENCOUNTER — PHARMACY VISIT (OUTPATIENT)
Dept: PHARMACY | Facility: CLINIC | Age: 39
End: 2024-04-08
Payer: COMMERCIAL

## 2024-04-08 PROCEDURE — RXMED WILLOW AMBULATORY MEDICATION CHARGE

## 2024-04-08 RX ORDER — PREDNISONE 20 MG/1
TABLET ORAL
Qty: 2 TABLET | Refills: 0 | OUTPATIENT
Start: 2024-04-08 | End: 2024-06-11 | Stop reason: SDUPTHER

## 2024-04-08 RX ORDER — OFLOXACIN 3 MG/ML
SOLUTION AURICULAR (OTIC)
Qty: 10 ML | Refills: 0 | OUTPATIENT
Start: 2024-04-08

## 2024-04-13 ENCOUNTER — PHARMACY VISIT (OUTPATIENT)
Dept: PHARMACY | Facility: CLINIC | Age: 39
End: 2024-04-13
Payer: COMMERCIAL

## 2024-04-13 PROCEDURE — RXMED WILLOW AMBULATORY MEDICATION CHARGE

## 2024-04-23 DIAGNOSIS — L70.9 ACNE, UNSPECIFIED ACNE TYPE: ICD-10-CM

## 2024-04-24 PROCEDURE — RXMED WILLOW AMBULATORY MEDICATION CHARGE

## 2024-04-24 RX ORDER — SPIRONOLACTONE 50 MG/1
150 TABLET, FILM COATED ORAL DAILY
Qty: 90 TABLET | Refills: 0 | Status: SHIPPED | OUTPATIENT
Start: 2024-04-24 | End: 2024-05-23 | Stop reason: SDUPTHER

## 2024-04-24 NOTE — TELEPHONE ENCOUNTER
Recent Visits  Date Type Provider Dept   01/26/24 Office Visit MD Shaan Garcia [place] [id number]   11/07/23 Office Visit MD Shaan Garcia [place] [address]   Showing recent visits within past 365 days and meeting all other requirements  Future Appointments  No visits were found meeting these conditions.  Showing future appointments within next 90 days and meeting all other requirements    Last ov 1/26/24

## 2024-05-01 ENCOUNTER — PHARMACY VISIT (OUTPATIENT)
Dept: PHARMACY | Facility: CLINIC | Age: 39
End: 2024-05-01
Payer: COMMERCIAL

## 2024-05-02 PROCEDURE — RXMED WILLOW AMBULATORY MEDICATION CHARGE

## 2024-05-03 ENCOUNTER — PHARMACY VISIT (OUTPATIENT)
Dept: PHARMACY | Facility: CLINIC | Age: 39
End: 2024-05-03
Payer: COMMERCIAL

## 2024-05-10 PROCEDURE — RXMED WILLOW AMBULATORY MEDICATION CHARGE

## 2024-05-11 ENCOUNTER — PHARMACY VISIT (OUTPATIENT)
Dept: PHARMACY | Facility: CLINIC | Age: 39
End: 2024-05-11
Payer: COMMERCIAL

## 2024-05-23 DIAGNOSIS — L70.9 ACNE, UNSPECIFIED ACNE TYPE: ICD-10-CM

## 2024-05-23 DIAGNOSIS — F51.01 PRIMARY INSOMNIA: ICD-10-CM

## 2024-05-23 DIAGNOSIS — G89.29 OTHER CHRONIC PAIN: ICD-10-CM

## 2024-05-23 PROCEDURE — RXMED WILLOW AMBULATORY MEDICATION CHARGE

## 2024-05-23 RX ORDER — ZOLPIDEM TARTRATE 10 MG/1
10 TABLET ORAL NIGHTLY PRN
Qty: 30 TABLET | Refills: 2 | Status: SHIPPED | OUTPATIENT
Start: 2024-05-23 | End: 2024-08-21

## 2024-05-23 RX ORDER — GABAPENTIN 300 MG/1
300 CAPSULE ORAL 3 TIMES DAILY
Qty: 90 CAPSULE | Refills: 2 | Status: SHIPPED | OUTPATIENT
Start: 2024-05-23 | End: 2024-06-22

## 2024-05-23 RX ORDER — SPIRONOLACTONE 50 MG/1
150 TABLET, FILM COATED ORAL DAILY
Qty: 90 TABLET | Refills: 0 | Status: SHIPPED | OUTPATIENT
Start: 2024-05-23 | End: 2024-06-28

## 2024-05-23 NOTE — TELEPHONE ENCOUNTER
Recent Visits  Date Type Provider Dept   01/26/24 Office Visit MD Shaan Garcia [place] [id number]   11/07/23 Office Visit Sil Brewer MD [place] [id number]   Showing recent visits within past 365 days and meeting all other requirements  Future Appointments  No visits were found meeting these conditions.  Showing future appointments within next 90 days and meeting all other requirements

## 2024-05-29 ENCOUNTER — PHARMACY VISIT (OUTPATIENT)
Dept: PHARMACY | Facility: CLINIC | Age: 39
End: 2024-05-29
Payer: COMMERCIAL

## 2024-05-31 PROCEDURE — RXMED WILLOW AMBULATORY MEDICATION CHARGE

## 2024-06-04 ENCOUNTER — PHARMACY VISIT (OUTPATIENT)
Dept: PHARMACY | Facility: CLINIC | Age: 39
End: 2024-06-04
Payer: COMMERCIAL

## 2024-06-07 PROCEDURE — RXMED WILLOW AMBULATORY MEDICATION CHARGE

## 2024-06-10 ENCOUNTER — PHARMACY VISIT (OUTPATIENT)
Dept: PHARMACY | Facility: CLINIC | Age: 39
End: 2024-06-10
Payer: COMMERCIAL

## 2024-06-11 ENCOUNTER — PHARMACY VISIT (OUTPATIENT)
Dept: PHARMACY | Facility: CLINIC | Age: 39
End: 2024-06-11
Payer: COMMERCIAL

## 2024-06-11 PROCEDURE — RXMED WILLOW AMBULATORY MEDICATION CHARGE

## 2024-06-11 RX ORDER — PREDNISONE 20 MG/1
40 TABLET ORAL DAILY
Qty: 21 TABLET | Refills: 0 | OUTPATIENT
Start: 2024-06-10

## 2024-06-11 RX ORDER — EPINEPHRINE 0.3 MG/.3ML
INJECTION SUBCUTANEOUS
Qty: 2 EACH | Refills: 1 | OUTPATIENT
Start: 2024-06-10

## 2024-06-26 NOTE — PROGRESS NOTES
This is a 38 year old female for FU visit and review of conditions and FMLA PPWK update as needed    ROS is NEG for HEADACHE, NAUSEA, VOMITING, DIARRHEA, CHEST PAIN, SOB, and BLEEDING and as further REVIEWED BELOW.        Subjective   Leeann Mark is a 38 y.o. female who presents for FMLA pw.    HPI:    Per nursing intake, pt here for FMLA pw       Review of systems is essentially negative for all systems except for any identified issues in HPI above.    Objective     There were no vitals taken for this visit.     Physical Examination:       GENERAL           General Appearance: well-appearing, well-developed, well-hydrated, well-nourished, no acute distress.        HEENT           NECK supple, no masses or thyromegaly, no carotid bruit.        EYES           Extraocular Movements: normal, bilateral eyes SAMIR, no conjunctival injection.        HEART           Rate and Rhythm regular rate and rhythm. Heart sounds: normal S1S2, no S3 or S4. Murmurs: none.        CHEST           Breath sounds: Clear to IPPA, RR<16 no use of accessory muscles.        ABDOMEN           General: Neg for LKKS or masses, no scleral icterus or jaundice.        MUSCULOSKELETAL           Joints Demonstration: Neg for erythema, swelling or joint deformities. gross abnormalities no gross abnormalities.        EXTREMITIES           Lower Extremities: Neg for cyanosis, clubbing or edema.       Assessment/Plan   Problem List Items Addressed This Visit       Lumbar disc herniation - Primary     Other Visit Diagnoses       Health counseling        Acne, unspecified acne type        Relevant Medications    spironolactone (Aldactone) 50 mg tablet    Medication refill        Relevant Medications    spironolactone (Aldactone) 50 mg tablet    History of colitis        Irritable bowel syndrome, unspecified type                FOLLOW UP:  PRN and as specified above         Sil Hackett M.D.

## 2024-06-28 DIAGNOSIS — L70.9 ACNE, UNSPECIFIED ACNE TYPE: ICD-10-CM

## 2024-06-28 PROCEDURE — RXMED WILLOW AMBULATORY MEDICATION CHARGE

## 2024-06-28 RX ORDER — SPIRONOLACTONE 50 MG/1
150 TABLET, FILM COATED ORAL DAILY
Qty: 90 TABLET | Refills: 0 | Status: SHIPPED | OUTPATIENT
Start: 2024-06-28 | End: 2024-07-28

## 2024-07-01 ENCOUNTER — PHARMACY VISIT (OUTPATIENT)
Dept: PHARMACY | Facility: CLINIC | Age: 39
End: 2024-07-01
Payer: COMMERCIAL

## 2024-07-02 DIAGNOSIS — L70.9 ACNE, UNSPECIFIED ACNE TYPE: ICD-10-CM

## 2024-07-02 DIAGNOSIS — Z76.0 MEDICATION REFILL: ICD-10-CM

## 2024-07-02 PROCEDURE — RXMED WILLOW AMBULATORY MEDICATION CHARGE

## 2024-07-02 RX ORDER — SPIRONOLACTONE 50 MG/1
150 TABLET, FILM COATED ORAL DAILY
Qty: 90 TABLET | Refills: 0 | Status: SHIPPED | OUTPATIENT
Start: 2024-07-02 | End: 2024-08-04

## 2024-07-05 ENCOUNTER — PHARMACY VISIT (OUTPATIENT)
Dept: PHARMACY | Facility: CLINIC | Age: 39
End: 2024-07-05
Payer: COMMERCIAL

## 2024-07-05 PROCEDURE — RXMED WILLOW AMBULATORY MEDICATION CHARGE

## 2024-07-09 ENCOUNTER — OFFICE VISIT (OUTPATIENT)
Dept: PRIMARY CARE | Facility: CLINIC | Age: 39
End: 2024-07-09
Payer: COMMERCIAL

## 2024-07-09 VITALS
SYSTOLIC BLOOD PRESSURE: 138 MMHG | BODY MASS INDEX: 27.81 KG/M2 | HEART RATE: 88 BPM | TEMPERATURE: 98.6 F | OXYGEN SATURATION: 97 % | WEIGHT: 157 LBS | DIASTOLIC BLOOD PRESSURE: 90 MMHG

## 2024-07-09 DIAGNOSIS — Z71.9 HEALTH COUNSELING: ICD-10-CM

## 2024-07-09 DIAGNOSIS — L70.9 ACNE, UNSPECIFIED ACNE TYPE: ICD-10-CM

## 2024-07-09 DIAGNOSIS — Z76.0 MEDICATION REFILL: ICD-10-CM

## 2024-07-09 DIAGNOSIS — Z87.19 HISTORY OF COLITIS: ICD-10-CM

## 2024-07-09 DIAGNOSIS — K58.9 IRRITABLE BOWEL SYNDROME, UNSPECIFIED TYPE: ICD-10-CM

## 2024-07-09 DIAGNOSIS — M51.26 LUMBAR DISC HERNIATION: Primary | ICD-10-CM

## 2024-07-09 PROCEDURE — 1036F TOBACCO NON-USER: CPT | Performed by: FAMILY MEDICINE

## 2024-07-09 PROCEDURE — 99213 OFFICE O/P EST LOW 20 MIN: CPT | Performed by: FAMILY MEDICINE

## 2024-07-09 RX ORDER — SPIRONOLACTONE 50 MG/1
150 TABLET, FILM COATED ORAL DAILY
Qty: 270 TABLET | Refills: 3 | Status: SHIPPED | OUTPATIENT
Start: 2024-07-09 | End: 2025-07-09

## 2024-07-09 ASSESSMENT — ENCOUNTER SYMPTOMS
OCCASIONAL FEELINGS OF UNSTEADINESS: 0
LOSS OF SENSATION IN FEET: 0
DEPRESSION: 0

## 2024-07-09 ASSESSMENT — PATIENT HEALTH QUESTIONNAIRE - PHQ9
2. FEELING DOWN, DEPRESSED OR HOPELESS: NOT AT ALL
1. LITTLE INTEREST OR PLEASURE IN DOING THINGS: NOT AT ALL
SUM OF ALL RESPONSES TO PHQ9 QUESTIONS 1 AND 2: 0

## 2024-07-09 ASSESSMENT — COLUMBIA-SUICIDE SEVERITY RATING SCALE - C-SSRS
2. HAVE YOU ACTUALLY HAD ANY THOUGHTS OF KILLING YOURSELF?: NO
1. IN THE PAST MONTH, HAVE YOU WISHED YOU WERE DEAD OR WISHED YOU COULD GO TO SLEEP AND NOT WAKE UP?: NO
6. HAVE YOU EVER DONE ANYTHING, STARTED TO DO ANYTHING, OR PREPARED TO DO ANYTHING TO END YOUR LIFE?: NO

## 2024-07-09 ASSESSMENT — PAIN SCALES - GENERAL: PAINLEVEL: 3

## 2024-07-09 NOTE — PROGRESS NOTES
At pt requested faxed all scanned/MD signed FMLA pw to [facility] [employer] at [phone]

## 2024-07-26 ENCOUNTER — PHARMACY VISIT (OUTPATIENT)
Dept: PHARMACY | Facility: CLINIC | Age: 39
End: 2024-07-26
Payer: COMMERCIAL

## 2024-07-26 PROCEDURE — RXMED WILLOW AMBULATORY MEDICATION CHARGE

## 2024-08-01 PROCEDURE — RXMED WILLOW AMBULATORY MEDICATION CHARGE

## 2024-08-05 ENCOUNTER — PHARMACY VISIT (OUTPATIENT)
Dept: PHARMACY | Facility: CLINIC | Age: 39
End: 2024-08-05
Payer: COMMERCIAL

## 2024-08-05 RX ORDER — AMITRIPTYLINE HYDROCHLORIDE 10 MG/1
TABLET, FILM COATED ORAL
Qty: 180 TABLET | Refills: 3 | OUTPATIENT
Start: 2024-08-05

## 2024-08-12 DIAGNOSIS — F51.01 PRIMARY INSOMNIA: ICD-10-CM

## 2024-08-12 RX ORDER — ZOLPIDEM TARTRATE 10 MG/1
10 TABLET ORAL NIGHTLY PRN
Qty: 30 TABLET | Refills: 2 | Status: CANCELLED | OUTPATIENT
Start: 2024-08-12 | End: 2024-11-10

## 2024-08-13 DIAGNOSIS — F51.01 PRIMARY INSOMNIA: ICD-10-CM

## 2024-08-14 RX ORDER — ZOLPIDEM TARTRATE 10 MG/1
10 TABLET ORAL NIGHTLY PRN
Qty: 30 TABLET | Refills: 2 | Status: SHIPPED | OUTPATIENT
Start: 2024-08-14 | End: 2024-11-12

## 2024-08-23 ENCOUNTER — PHARMACY VISIT (OUTPATIENT)
Dept: PHARMACY | Facility: CLINIC | Age: 39
End: 2024-08-23
Payer: COMMERCIAL

## 2024-08-23 PROCEDURE — RXMED WILLOW AMBULATORY MEDICATION CHARGE

## 2024-08-28 ENCOUNTER — PHARMACY VISIT (OUTPATIENT)
Dept: PHARMACY | Facility: CLINIC | Age: 39
End: 2024-08-28
Payer: COMMERCIAL

## 2024-08-28 PROCEDURE — RXMED WILLOW AMBULATORY MEDICATION CHARGE

## 2024-09-16 ENCOUNTER — OFFICE VISIT (OUTPATIENT)
Dept: PRIMARY CARE | Facility: CLINIC | Age: 39
End: 2024-09-16
Payer: COMMERCIAL

## 2024-09-16 VITALS
WEIGHT: 163 LBS | HEART RATE: 94 BPM | DIASTOLIC BLOOD PRESSURE: 78 MMHG | OXYGEN SATURATION: 97 % | HEIGHT: 63 IN | SYSTOLIC BLOOD PRESSURE: 122 MMHG | BODY MASS INDEX: 28.88 KG/M2 | TEMPERATURE: 97.8 F

## 2024-09-16 DIAGNOSIS — Z13.6 SCREENING FOR CARDIOVASCULAR CONDITION: ICD-10-CM

## 2024-09-16 DIAGNOSIS — Z00.00 ANNUAL PHYSICAL EXAM: Primary | ICD-10-CM

## 2024-09-16 DIAGNOSIS — E55.9 VITAMIN D DEFICIENCY: ICD-10-CM

## 2024-09-16 DIAGNOSIS — Z76.89 ENCOUNTER TO ESTABLISH CARE WITH NEW DOCTOR: ICD-10-CM

## 2024-09-16 DIAGNOSIS — N92.6 IRREGULAR PERIODS: ICD-10-CM

## 2024-09-16 DIAGNOSIS — Z12.31 ENCOUNTER FOR SCREENING MAMMOGRAM FOR MALIGNANT NEOPLASM OF BREAST: ICD-10-CM

## 2024-09-16 DIAGNOSIS — R63.5 WEIGHT GAIN: ICD-10-CM

## 2024-09-16 DIAGNOSIS — Z88.7 ALLERGY TO INFLUENZA VACCINE: ICD-10-CM

## 2024-09-16 PROCEDURE — 3008F BODY MASS INDEX DOCD: CPT | Performed by: STUDENT IN AN ORGANIZED HEALTH CARE EDUCATION/TRAINING PROGRAM

## 2024-09-16 PROCEDURE — 99396 PREV VISIT EST AGE 40-64: CPT | Performed by: STUDENT IN AN ORGANIZED HEALTH CARE EDUCATION/TRAINING PROGRAM

## 2024-09-16 PROCEDURE — 99214 OFFICE O/P EST MOD 30 MIN: CPT | Performed by: STUDENT IN AN ORGANIZED HEALTH CARE EDUCATION/TRAINING PROGRAM

## 2024-09-16 ASSESSMENT — PAIN SCALES - GENERAL: PAINLEVEL: 3

## 2024-09-16 NOTE — PROGRESS NOTES
"Subjective   Leeann Mark is a 39 y.o. female who presents for Annual Exam.    HPI:      This is a 39-year-old female presenting as a new patient for establish care visit and physical exam.    PREVENTATIVE HEALTH CARE:    Immunizations:  COVID:  DUE  Flu:  HX OF ALLERGY  TDaP:  DUE  Pneumonia:  former smoker, not currently indicated    Immunization History   Administered Date(s) Administered    Varicella vaccine, subcutaneous (VARIVAX) 08/12/2008       Screenings:  HIV/HCV:  Pap:  Remote hx of high grade cervical dysplasia.  3/23/2021 wnl, HPV negative - utd  Mammogram:  DUE  Colonoscopy: Not currently indicated.  Prior colonoscopy with Dr. Zhou, told not to get another until routine screening.    Family History of Issaquena's Disease:  Mother passed away of Issaquena's disease.  Testing at age 17, negative.    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 Sees Dr. Robertson in neurology who also advises against all vaccination.    Irregular Periods:  New in past 4 months.  No abnormal hair growth.      Weight Gain:  Going to the    Acne:  Taking spironolactone, managed previously by Dr. Hackett.    Long COVID:  Taking amitryptiline for daily headaches, managed by Dr. Robertson     Disc Hernia:  Gabapentin by Dr. Hardwick.        ROS:    Review of systems is essentially negative for all systems except for any identified issues in HPI above.    Objective     /78   Pulse 94   Temp 36.6 °C (97.8 °F)   Ht 1.6 m (5' 3\")   Wt 73.9 kg (163 lb)   SpO2 97%   BMI 28.87 kg/m²      PHYSICAL EXAM    GENERAL  Well-appearing, pleasant and cooperative.  No acute distress.    HEENT  HEAD:   Normocephalic.  Atraumatic.  EYES:  PERRLA.  No scleral icterus or conjunctival injection.  EARS:  Tympanic membranes visualized bilaterally without erythema, fluid, or bulging.  NECK:  No adenopathy.  No palpable thyroid enlargement or nodules.    THROAT:  Moist oropharynx without tonsillar enlargement or exudates.    LUNGS:  "   Clear to auscultation bilaterally.  No wheezes, rales, rhonchi.    CARDIAC:  Regular rate and rhythm.  Normal S1S2.  No murmurs/rubs/gallops.    ABDOMEN:  Soft, non-tender, non-distended.  No hepatosplenomegaly.  Normoactive bowel sounds.    MUSCULOSKELETAL:  No gross abnormalities.   No joint swelling or erythema,.  No spinal or paraspinal tenderness to palpation.    EXTREMITIES:  No LE edema or cyanosis.      NEURO           Alert and oriented x3. No focal deficits.    PSYCH:          Affect appropriate.           Assessment/Plan   Problem List Items Addressed This Visit       Vitamin D deficiency    Relevant Orders    Vitamin D 25-Hydroxy,Total (for eval of Vitamin D levels)     Other Visit Diagnoses       Encounter to establish care with new doctor    -  Primary    Encounter for screening mammogram for malignant neoplasm of breast        Relevant Orders    BI mammo bilateral screening tomosynthesis    Allergy to influenza vaccine        Annual physical exam        Relevant Orders    Hepatitis C Antibody    HIV 1/2 Antigen/Antibody Screen with Reflex to Confirmation    TSH with reflex to Free T4 if abnormal    Lipid Panel    CBC    Comprehensive Metabolic Panel    Screening for cardiovascular condition        Relevant Orders    Lipid Panel    Irregular periods        Relevant Orders    Luteinizing hormone    FSH    Testosterone    Weight gain        Relevant Orders    TSH with reflex to Free T4 if abnormal    Lipid Panel    Antonia Lyoa MD

## 2024-09-16 NOTE — PATIENT INSTRUCTIONS
It was a pleasure to meet you today.    Go to the lab for fasting blood work, we will call you with all results.    Mammogram order entered today, call to schedule.    Influenza vaccination paperwork completed today copy scanned into your chart and original returned to you.    Follow-up with me in 1 month for lab review/weight management discussion, sooner if needed.

## 2024-09-21 PROCEDURE — RXMED WILLOW AMBULATORY MEDICATION CHARGE

## 2024-09-23 ENCOUNTER — PHARMACY VISIT (OUTPATIENT)
Dept: PHARMACY | Facility: CLINIC | Age: 39
End: 2024-09-23
Payer: COMMERCIAL

## 2024-09-23 ENCOUNTER — LAB (OUTPATIENT)
Dept: LAB | Facility: LAB | Age: 39
End: 2024-09-23
Payer: COMMERCIAL

## 2024-09-23 ENCOUNTER — OFFICE VISIT (OUTPATIENT)
Dept: PAIN MEDICINE | Facility: CLINIC | Age: 39
End: 2024-09-23
Payer: COMMERCIAL

## 2024-09-23 VITALS
OXYGEN SATURATION: 97 % | BODY MASS INDEX: 28.88 KG/M2 | WEIGHT: 163 LBS | RESPIRATION RATE: 16 BRPM | HEART RATE: 93 BPM | HEIGHT: 63 IN | DIASTOLIC BLOOD PRESSURE: 72 MMHG | SYSTOLIC BLOOD PRESSURE: 108 MMHG

## 2024-09-23 DIAGNOSIS — N92.6 IRREGULAR PERIODS: ICD-10-CM

## 2024-09-23 DIAGNOSIS — M51.26 DISPLACEMENT OF LUMBAR INTERVERTEBRAL DISC WITHOUT MYELOPATHY: ICD-10-CM

## 2024-09-23 DIAGNOSIS — M51.36 DDD (DEGENERATIVE DISC DISEASE), LUMBAR: Primary | ICD-10-CM

## 2024-09-23 DIAGNOSIS — Z13.6 SCREENING FOR CARDIOVASCULAR CONDITION: ICD-10-CM

## 2024-09-23 DIAGNOSIS — Z00.00 ANNUAL PHYSICAL EXAM: ICD-10-CM

## 2024-09-23 DIAGNOSIS — G89.29 OTHER CHRONIC PAIN: ICD-10-CM

## 2024-09-23 DIAGNOSIS — R63.5 WEIGHT GAIN: ICD-10-CM

## 2024-09-23 DIAGNOSIS — E55.9 VITAMIN D DEFICIENCY: ICD-10-CM

## 2024-09-23 LAB
25(OH)D3 SERPL-MCNC: 33 NG/ML (ref 30–100)
ALBUMIN SERPL BCP-MCNC: 4.2 G/DL (ref 3.4–5)
ALP SERPL-CCNC: 84 U/L (ref 33–110)
ALT SERPL W P-5'-P-CCNC: 12 U/L (ref 7–45)
ANION GAP SERPL CALCULATED.3IONS-SCNC: 11 MMOL/L (ref 10–20)
AST SERPL W P-5'-P-CCNC: 12 U/L (ref 9–39)
BILIRUB SERPL-MCNC: 0.5 MG/DL (ref 0–1.2)
BUN SERPL-MCNC: 11 MG/DL (ref 6–23)
CALCIUM SERPL-MCNC: 8.5 MG/DL (ref 8.6–10.3)
CHLORIDE SERPL-SCNC: 106 MMOL/L (ref 98–107)
CHOLEST SERPL-MCNC: 151 MG/DL (ref 0–199)
CHOLEST/HDLC SERPL: 3.5 {RATIO}
CO2 SERPL-SCNC: 24 MMOL/L (ref 21–32)
CREAT SERPL-MCNC: 0.78 MG/DL (ref 0.5–1.05)
EGFRCR SERPLBLD CKD-EPI 2021: >90 ML/MIN/1.73M*2
ERYTHROCYTE [DISTWIDTH] IN BLOOD BY AUTOMATED COUNT: 12.4 % (ref 11.5–14.5)
FSH SERPL-ACNC: 4 IU/L
GLUCOSE SERPL-MCNC: 97 MG/DL (ref 74–99)
HCT VFR BLD AUTO: 38.9 % (ref 36–46)
HCV AB SER QL: NONREACTIVE
HDLC SERPL-MCNC: 43.4 MG/DL
HGB BLD-MCNC: 13.1 G/DL (ref 12–16)
HIV 1+2 AB+HIV1 P24 AG SERPL QL IA: NONREACTIVE
LDLC SERPL CALC-MCNC: 77 MG/DL
LH SERPL-ACNC: 2.5 IU/L
MCH RBC QN AUTO: 29.6 PG (ref 26–34)
MCHC RBC AUTO-ENTMCNC: 33.7 G/DL (ref 32–36)
MCV RBC AUTO: 88 FL (ref 80–100)
NON HDL CHOLESTEROL: 108 MG/DL (ref 0–149)
NRBC BLD-RTO: 0 /100 WBCS (ref 0–0)
PLATELET # BLD AUTO: 285 X10*3/UL (ref 150–450)
POTASSIUM SERPL-SCNC: 4.3 MMOL/L (ref 3.5–5.3)
PROT SERPL-MCNC: 6.6 G/DL (ref 6.4–8.2)
RBC # BLD AUTO: 4.42 X10*6/UL (ref 4–5.2)
SODIUM SERPL-SCNC: 137 MMOL/L (ref 136–145)
TESTOST SERPL-MCNC: <30 NG/DL (ref 0–70)
TRIGL SERPL-MCNC: 151 MG/DL (ref 0–149)
TSH SERPL-ACNC: 1.6 MIU/L (ref 0.44–3.98)
VLDL: 30 MG/DL (ref 0–40)
WBC # BLD AUTO: 6.3 X10*3/UL (ref 4.4–11.3)

## 2024-09-23 PROCEDURE — 87389 HIV-1 AG W/HIV-1&-2 AB AG IA: CPT

## 2024-09-23 PROCEDURE — 36415 COLL VENOUS BLD VENIPUNCTURE: CPT

## 2024-09-23 PROCEDURE — 80053 COMPREHEN METABOLIC PANEL: CPT

## 2024-09-23 PROCEDURE — 83001 ASSAY OF GONADOTROPIN (FSH): CPT

## 2024-09-23 PROCEDURE — 84443 ASSAY THYROID STIM HORMONE: CPT

## 2024-09-23 PROCEDURE — 84403 ASSAY OF TOTAL TESTOSTERONE: CPT

## 2024-09-23 PROCEDURE — 3008F BODY MASS INDEX DOCD: CPT | Performed by: ANESTHESIOLOGY

## 2024-09-23 PROCEDURE — 83002 ASSAY OF GONADOTROPIN (LH): CPT

## 2024-09-23 PROCEDURE — 86803 HEPATITIS C AB TEST: CPT

## 2024-09-23 PROCEDURE — 85027 COMPLETE CBC AUTOMATED: CPT

## 2024-09-23 PROCEDURE — 1036F TOBACCO NON-USER: CPT | Performed by: ANESTHESIOLOGY

## 2024-09-23 PROCEDURE — 80061 LIPID PANEL: CPT

## 2024-09-23 PROCEDURE — 99214 OFFICE O/P EST MOD 30 MIN: CPT | Performed by: ANESTHESIOLOGY

## 2024-09-23 PROCEDURE — 82306 VITAMIN D 25 HYDROXY: CPT

## 2024-09-23 RX ORDER — GABAPENTIN 300 MG/1
300 CAPSULE ORAL 3 TIMES DAILY
Qty: 270 CAPSULE | Refills: 1 | Status: SHIPPED | OUTPATIENT
Start: 2024-09-23 | End: 2024-12-25

## 2024-09-23 ASSESSMENT — ENCOUNTER SYMPTOMS
RESPIRATORY NEGATIVE: 1
ENDOCRINE NEGATIVE: 1
BACK PAIN: 1
CONSTITUTIONAL NEGATIVE: 1
EYES NEGATIVE: 1
GASTROINTESTINAL NEGATIVE: 1
HEMATOLOGIC/LYMPHATIC NEGATIVE: 1
CARDIOVASCULAR NEGATIVE: 1
PSYCHIATRIC NEGATIVE: 1
ALLERGIC/IMMUNOLOGIC NEGATIVE: 1
NEUROLOGICAL NEGATIVE: 1

## 2024-09-23 ASSESSMENT — PAIN - FUNCTIONAL ASSESSMENT: PAIN_FUNCTIONAL_ASSESSMENT: 0-10

## 2024-09-23 ASSESSMENT — LIFESTYLE VARIABLES: TOTAL SCORE: 2

## 2024-09-23 ASSESSMENT — PAIN SCALES - GENERAL
PAINLEVEL_OUTOF10: 2
PAINLEVEL: 2

## 2024-09-23 ASSESSMENT — PAIN DESCRIPTION - DESCRIPTORS: DESCRIPTORS: ACHING

## 2024-09-23 NOTE — PROGRESS NOTES
Subjective   Patient ID: Leeann Mark is a 39 y.o. female who presents for Back Pain.  Back Pain    Patient here today for follow up today.  Patient has been seen once or twice a year for the last 3 years.  She continues to do well on current regimen of gabapentin when she needs a refill for.  She is reporting no lower extremity pain.  She does have episodes where when she moves from a flexed and extended position she will get a sharp very sharp pain in her back.  Its typically been more right-sided but has had a couple episodes in the left side of her back.  She has no lower extremity weakness.  She has no lower extremity numbness.  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  Overall she is doing doing very well.  Average daily pain is a 2 out of 10.    Review of Systems   Constitutional: Negative.    HENT: Negative.     Eyes: Negative.    Respiratory: Negative.     Cardiovascular: Negative.    Gastrointestinal: Negative.    Endocrine: Negative.    Genitourinary: Negative.    Musculoskeletal:  Positive for back pain.   Skin: Negative.    Allergic/Immunologic: Negative.    Neurological: Negative.    Hematological: Negative.    Psychiatric/Behavioral: Negative.         Objective   Physical Exam  Vitals and nursing note reviewed.   Constitutional:       Appearance: Normal appearance.   HENT:      Head: Normocephalic and atraumatic.      Right Ear: Ear canal and external ear normal.      Left Ear: Ear canal and external ear normal.      Nose: Nose normal.      Mouth/Throat:      Mouth: Mucous membranes are moist.      Pharynx: Oropharynx is clear.   Eyes:      Conjunctiva/sclera: Conjunctivae normal.      Pupils: Pupils are equal, round, and reactive to light.   Cardiovascular:      Rate and Rhythm: Normal rate.   Pulmonary:      Effort: Pulmonary effort is normal. No respiratory distress.   Musculoskeletal:      Cervical back: Normal range of motion and neck supple.      Lumbar  back: Tenderness present. Normal range of motion.   Skin:     General: Skin is warm and dry.   Neurological:      Mental Status: She is alert.   Psychiatric:         Mood and Affect: Mood normal.         Thought Content: Thought content normal.         Assessment/Plan   Problem List Items Addressed This Visit    None  Visit Diagnoses         Codes    DDD (degenerative disc disease), lumbar    -  Primary M51.36    Displacement of lumbar intervertebral disc without myelopathy     M51.26             I nice discussion with the patient today our plan will be as follows.    Radiology: All available imaging was reviewed today.  Patient does have Modic endplate changes seen at L5-S1 on her 2021 MRI.    Physically: Continue home exercise regimen.    Psychologically: No behavioral health issues noted.    Medication: Gabapentin refilled for the next 6 months without changes.  A PDMP report was checked today, and was consistent with reported prescribing.    Duration: Greater than 3 years.    Intervention: No intervention at this time patient stable      Tobias Hardwick MD 09/23/24 11:46 AM

## 2024-09-26 PROCEDURE — RXMED WILLOW AMBULATORY MEDICATION CHARGE

## 2024-09-30 ENCOUNTER — PHARMACY VISIT (OUTPATIENT)
Dept: PHARMACY | Facility: CLINIC | Age: 39
End: 2024-09-30
Payer: COMMERCIAL

## 2024-09-30 ENCOUNTER — APPOINTMENT (OUTPATIENT)
Dept: SLEEP MEDICINE | Facility: CLINIC | Age: 39
End: 2024-09-30
Payer: COMMERCIAL

## 2024-09-30 VITALS
OXYGEN SATURATION: 97 % | BODY MASS INDEX: 28.88 KG/M2 | HEART RATE: 96 BPM | SYSTOLIC BLOOD PRESSURE: 110 MMHG | WEIGHT: 163 LBS | DIASTOLIC BLOOD PRESSURE: 68 MMHG | HEIGHT: 63 IN

## 2024-09-30 DIAGNOSIS — G47.26 SHIFT WORK SLEEP DISORDER: Primary | ICD-10-CM

## 2024-09-30 DIAGNOSIS — F51.01 PRIMARY INSOMNIA: ICD-10-CM

## 2024-09-30 PROCEDURE — 3008F BODY MASS INDEX DOCD: CPT | Performed by: INTERNAL MEDICINE

## 2024-09-30 PROCEDURE — 99213 OFFICE O/P EST LOW 20 MIN: CPT | Performed by: INTERNAL MEDICINE

## 2024-09-30 PROCEDURE — 1036F TOBACCO NON-USER: CPT | Performed by: INTERNAL MEDICINE

## 2024-09-30 ASSESSMENT — SLEEP AND FATIGUE QUESTIONNAIRES
HOW LIKELY ARE YOU TO NOD OFF OR FALL ASLEEP WHILE SITTING QUIETLY AFTER LUNCH WITHOUT ALCOHOL: WOULD NEVER DOZE
HOW LIKELY ARE YOU TO NOD OFF OR FALL ASLEEP WHEN YOU ARE A PASSENGER IN A CAR FOR AN HOUR WITHOUT A BREAK: SLIGHT CHANCE OF DOZING
ESS-CHAD TOTAL SCORE: 2
HOW LIKELY ARE YOU TO NOD OFF OR FALL ASLEEP IN A CAR, WHILE STOPPED FOR A FEW MINUTES IN TRAFFIC: WOULD NEVER DOZE
HOW LIKELY ARE YOU TO NOD OFF OR FALL ASLEEP WHILE SITTING AND READING: WOULD NEVER DOZE
SITING INACTIVE IN A PUBLIC PLACE LIKE A CLASS ROOM OR A MOVIE THEATER: SLIGHT CHANCE OF DOZING
HOW LIKELY ARE YOU TO NOD OFF OR FALL ASLEEP WHILE LYING DOWN TO REST IN THE AFTERNOON WHEN CIRCUMSTANCES PERMIT: WOULD NEVER DOZE
HOW LIKELY ARE YOU TO NOD OFF OR FALL ASLEEP WHILE WATCHING TV: WOULD NEVER DOZE
HOW LIKELY ARE YOU TO NOD OFF OR FALL ASLEEP WHILE SITTING AND TALKING TO SOMEONE: WOULD NEVER DOZE

## 2024-09-30 ASSESSMENT — PAIN SCALES - GENERAL: PAINLEVEL: 0-NO PAIN

## 2024-09-30 NOTE — PROGRESS NOTES
Subjective   Patient ID: Leeann Mark is a 39 y.o. female who presents for Insomnia.  HPI    Prior Sleep History:      Current Sleep History:  Leeann presents for med management of her insomnia which is being treated with ambien. She is a shift worker, (7 PM - 7 AM) It is very effective and she denies side effects    ESS: 2     OARRS:  Gerardo Dougherty MD on 9/30/2024  9:13 AM  I have personally reviewed the OARRS report for Leeann Mark. I have considered the risks of abuse, dependence, addiction and diversion and I believe that it is clinically appropriate for Leeann Mark to be prescribed this medication    Is the patient prescribed a combination of a benzodiazepine and opioid?  No    Last Urine Drug Screen / ordered today: No  Recent Results (from the past 8760 hour(s))   Zolpidem Confirmation, Urine    Collection Time: 02/19/24  4:08 PM   Result Value Ref Range    Zolpidem <25 <25 ng/mL    Zolpidem Metabolite (ZCA) >1,000 (H) <25 ng/mL   Drug Screen, Urine With Reflex to Confirmation    Collection Time: 02/19/24  4:08 PM   Result Value Ref Range    Amphetamine Screen, Urine Presumptive Negative Presumptive Negative    Barbiturate Screen, Urine Presumptive Negative Presumptive Negative    Benzodiazepines Screen, Urine Presumptive Negative Presumptive Negative    Cannabinoid Screen, Urine Presumptive Negative Presumptive Negative    Cocaine Metabolite Screen, Urine Presumptive Negative Presumptive Negative    Fentanyl Screen, Urine Presumptive Negative Presumptive Negative    Opiate Screen, Urine Presumptive Negative Presumptive Negative    Oxycodone Screen, Urine Presumptive Negative Presumptive Negative    PCP Screen, Urine Presumptive Negative Presumptive Negative     Results are as expected.         Controlled Substance Agreement:  Date of the Last Agreement: 2/19/2024  Reviewed Controlled Substance Agreement including but not limited to the benefits, risks, and alternatives to treatment with a  "Controlled Substance medication(s).    Sleep Aids:   What is the patient's goal of therapy? To decrease sleep onset latency and improve sleep quality  Is this being achieved with current treatment? yes    Activities of Daily Living:   Is your overall impression that this patient is benefiting (symptom reduction outweighs side effects) from sleep aid therapy? Yes     1. Physical Functioning: Better  2. Family Relationship: Better  3. Social Relationship: Better  4. Mood: Better  5. Sleep Patterns: Better  6. Overall Function: Better     Review of Systems  Review of systems negative except as per HPI  Objective   /68   Pulse 96   Ht 1.6 m (5' 3\")   Wt 73.9 kg (163 lb)   SpO2 97%   BMI 28.87 kg/m²    PREVIOUS WEIGHTS:  Wt Readings from Last 3 Encounters:   09/30/24 73.9 kg (163 lb)   09/23/24 73.9 kg (163 lb)   09/16/24 73.9 kg (163 lb)     Physical Exam  PHYSICAL EXAM: GENERAL: alert pleasant and cooperative no acute distress  PSYCH EXAM: alert,oriented, in NAD with a full range of affect, normal behavior and no psychotic features    Lab Results   Component Value Date    IRON 128 06/11/2021    TIBC 354 06/11/2021    FERRITIN 24 06/11/2021        Assessment/Plan   Problem List Items Addressed This Visit             ICD-10-CM    Shift work sleep disorder - Primary G47.26     Managed well with zolpidem. Continue current treatment plan. Follow-up in 6 months         Insomnia G47.00     Continue zolpidem               "

## 2024-10-07 ENCOUNTER — HOSPITAL ENCOUNTER (OUTPATIENT)
Dept: RADIOLOGY | Facility: HOSPITAL | Age: 39
Discharge: HOME | End: 2024-10-07
Payer: COMMERCIAL

## 2024-10-07 VITALS — WEIGHT: 163 LBS | BODY MASS INDEX: 28.88 KG/M2 | HEIGHT: 63 IN

## 2024-10-07 DIAGNOSIS — Z12.31 ENCOUNTER FOR SCREENING MAMMOGRAM FOR MALIGNANT NEOPLASM OF BREAST: ICD-10-CM

## 2024-10-07 PROCEDURE — 77067 SCR MAMMO BI INCL CAD: CPT | Performed by: RADIOLOGY

## 2024-10-07 PROCEDURE — 77063 BREAST TOMOSYNTHESIS BI: CPT | Performed by: RADIOLOGY

## 2024-10-07 PROCEDURE — 77067 SCR MAMMO BI INCL CAD: CPT

## 2024-10-16 ENCOUNTER — OFFICE VISIT (OUTPATIENT)
Dept: PRIMARY CARE | Facility: CLINIC | Age: 39
End: 2024-10-16
Payer: COMMERCIAL

## 2024-10-16 ENCOUNTER — TELEPHONE (OUTPATIENT)
Dept: PRIMARY CARE | Facility: CLINIC | Age: 39
End: 2024-10-16

## 2024-10-16 ENCOUNTER — PHARMACY VISIT (OUTPATIENT)
Dept: PHARMACY | Facility: CLINIC | Age: 39
End: 2024-10-16
Payer: COMMERCIAL

## 2024-10-16 VITALS
WEIGHT: 164.6 LBS | HEART RATE: 88 BPM | BODY MASS INDEX: 29.16 KG/M2 | SYSTOLIC BLOOD PRESSURE: 118 MMHG | DIASTOLIC BLOOD PRESSURE: 86 MMHG | TEMPERATURE: 98.7 F | HEIGHT: 63 IN | OXYGEN SATURATION: 96 %

## 2024-10-16 DIAGNOSIS — E66.3 OVERWEIGHT (BMI 25.0-29.9): ICD-10-CM

## 2024-10-16 DIAGNOSIS — N63.10 MASS OF RIGHT BREAST, UNSPECIFIED QUADRANT: ICD-10-CM

## 2024-10-16 DIAGNOSIS — M16.0 PRIMARY OSTEOARTHRITIS OF BOTH HIPS: Primary | ICD-10-CM

## 2024-10-16 DIAGNOSIS — M51.26 LUMBAR DISC HERNIATION: ICD-10-CM

## 2024-10-16 PROCEDURE — 99214 OFFICE O/P EST MOD 30 MIN: CPT | Performed by: STUDENT IN AN ORGANIZED HEALTH CARE EDUCATION/TRAINING PROGRAM

## 2024-10-16 PROCEDURE — 1036F TOBACCO NON-USER: CPT | Performed by: STUDENT IN AN ORGANIZED HEALTH CARE EDUCATION/TRAINING PROGRAM

## 2024-10-16 PROCEDURE — 3008F BODY MASS INDEX DOCD: CPT | Performed by: STUDENT IN AN ORGANIZED HEALTH CARE EDUCATION/TRAINING PROGRAM

## 2024-10-16 PROCEDURE — RXMED WILLOW AMBULATORY MEDICATION CHARGE

## 2024-10-16 RX ORDER — PHENTERMINE HYDROCHLORIDE 37.5 MG/1
37.5 CAPSULE ORAL
Qty: 30 CAPSULE | Refills: 0 | Status: SHIPPED | OUTPATIENT
Start: 2024-10-16 | End: 2024-11-15

## 2024-10-16 ASSESSMENT — PAIN SCALES - GENERAL: PAINLEVEL_OUTOF10: 3

## 2024-10-16 ASSESSMENT — COLUMBIA-SUICIDE SEVERITY RATING SCALE - C-SSRS
6. HAVE YOU EVER DONE ANYTHING, STARTED TO DO ANYTHING, OR PREPARED TO DO ANYTHING TO END YOUR LIFE?: NO
2. HAVE YOU ACTUALLY HAD ANY THOUGHTS OF KILLING YOURSELF?: NO
1. IN THE PAST MONTH, HAVE YOU WISHED YOU WERE DEAD OR WISHED YOU COULD GO TO SLEEP AND NOT WAKE UP?: NO

## 2024-10-16 NOTE — TELEPHONE ENCOUNTER
PT came into office and states that she was charged a $25 co-pay for her last office visit 9/16/2024. This was the patient's new patient/physical appointment. There should have been no co-pay collected for a Physical. Please advise if PT can be refunded co pay.

## 2024-10-16 NOTE — PROGRESS NOTES
"Subjective   Leeann Mark is a 39 y.o. female who presents for Follow-up.    HPI:      This is a 39-year-old female presenting for follow-up visit.  Last seen by me for yearly physical on 9/16/2024.  At that time she had expressed concern for weight gain, was agreeable to complete routine labs and return for follow-up visit to discuss this issue which is what brings her here today.    Weight Management:    Has , works out 2-3 days per week.  Lindsey is vegan, eats what he eats more often than not.      COVID 11/20, Back issues 3/2021, took a while to recover.  Post covid put on amityrptiliyne, gabapentin for disc herniation. Also on zolpidem for insomnia.  Aware that all of these medications as well as mobility limitations likely contributed to weight gain.     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      Previously used HERS jacques.  Was on metformin, naltrexone-Wellbutrin.  Kept taking metformin initially 500 mg daily, then increased to 500 mg BID.  Had no weight loss with metformin, stopped naltrexone-Wellbutrin early on due to side effects, naltrexone making her feel \"off\".     History of chronic GI issues, would like to avoid GLP-1 agonists if possible due to, and GI side effects     7/13/2023 7/20/2023 9/6/2023 9/18/2023 11/7/2023   Vitals        Weight (lb) 149  151.6  150  150  156    BMI 26.39 kg/m2  26.85 kg/m2  26.57 kg/m2  26.57 kg/m2  27.63 kg/m2       1/26/2024 2/19/2024 4/8/2024 7/9/2024 9/16/2024   Vitals        Weight (lb) 156  154   157  163    BMI 27.63 kg/m2  27.28 kg/m2   27.81 kg/m2  28.87 kg/m2       9/23/2024 9/30/2024 10/7/2024   Vitals      Weight (lb) 163  163  163    BMI 28.87 kg/m2  28.87 kg/m2  28.87 kg/m2        Lab Results   Component Value Date    HGBA1C 5.3 10/12/2020     Lab Results   Component Value Date    GLUCOSE 97 09/23/2024    CALCIUM 8.5 (L) 09/23/2024     09/23/2024    K 4.3 " 09/23/2024    CO2 24 09/23/2024     09/23/2024    BUN 11 09/23/2024    CREATININE 0.78 09/23/2024     Lab Results   Component Value Date    TSH 1.60 09/23/2024     Lab Results   Component Value Date    CHOL 151 09/23/2024    CHOL 177 11/17/2023    CHOL 164 09/20/2022     Lab Results   Component Value Date    HDL 43.4 09/23/2024    HDL 57.0 11/17/2023    HDL 47 (L) 09/20/2022     Lab Results   Component Value Date    LDLCALC 77 09/23/2024    LDLCALC 103 11/17/2023    LDLCALC 91 09/20/2022     Lab Results   Component Value Date    TRIG 151 (H) 09/23/2024    TRIG 86 11/17/2023    TRIG 129 09/20/2022     Lab Results   Component Value Date    WBC 6.3 09/23/2024    HGB 13.1 09/23/2024    HCT 38.9 09/23/2024     09/23/2024    CHOL 151 09/23/2024    TRIG 151 (H) 09/23/2024    HDL 43.4 09/23/2024    ALT 12 09/23/2024    AST 12 09/23/2024     09/23/2024    K 4.3 09/23/2024     09/23/2024    CREATININE 0.78 09/23/2024    BUN 11 09/23/2024    CO2 24 09/23/2024    TSH 1.60 09/23/2024    HGBA1C 5.3 10/12/2020        OARRS:  Miriam Loya MD on 10/16/2024  2:53 PM  I have personally reviewed the OARRS report for Leeann Mark. I have considered the risks of abuse, dependence, addiction and diversion and I believe that it is clinically appropriate for Leeann Mark to be prescribed this medication    Is the patient prescribed a combination of a benzodiazepine and opioid?  No    Last Urine Drug Screen / ordered today: No  Recent Results (from the past 8760 hours)   Zolpidem Confirmation, Urine    Collection Time: 02/19/24  4:08 PM   Result Value Ref Range    Zolpidem <25 <25 ng/mL    Zolpidem Metabolite (ZCA) >1,000 (H) <25 ng/mL   Drug Screen, Urine With Reflex to Confirmation    Collection Time: 02/19/24  4:08 PM   Result Value Ref Range    Amphetamine Screen, Urine Presumptive Negative Presumptive Negative    Barbiturate Screen, Urine Presumptive Negative Presumptive Negative    Benzodiazepines  Screen, Urine Presumptive Negative Presumptive Negative    Cannabinoid Screen, Urine Presumptive Negative Presumptive Negative    Cocaine Metabolite Screen, Urine Presumptive Negative Presumptive Negative    Fentanyl Screen, Urine Presumptive Negative Presumptive Negative    Opiate Screen, Urine Presumptive Negative Presumptive Negative    Oxycodone Screen, Urine Presumptive Negative Presumptive Negative    PCP Screen, Urine Presumptive Negative Presumptive Negative     Results are as expected.     Controlled Substance Agreement:  Date of the Last Agreement: 10/16/24  Reviewed Controlled Substance Agreement including but not limited to the benefits, risks, and alternatives to treatment with a Controlled Substance medication(s).    Anorexiants:   What is the patient's goal of therapy? Weight loss/management  Is this being achieved with current treatment?  To be determined, initiating treatment today.    I have assessed the patient's continuing efforts to lose weight., I have assessed the patient's dedication to the treatment program and the response to treatment., and I have assessed the presence or absence of contraindications, adverse effects, and indicators of possible substance abuse that would necessitate cessation of treatment utilizing controlled substance.    Abnormal Mammogram:  Recent mammogram on 10/7/2024 showing R inferomedial breast mass (impression copied/italicized below).  Has breast US scheduled on 10/31.      IMPRESSION:  No mammographic evidence of malignancy in the left breast. Right  breast mass. Targeted ultrasound is recommended.    Cardiomyopathy:  Sick with COVID in 2020 with persistent tachycardia.  Saw Dr. aBlderas, with echocardiogram and other workup.  Symptoms resolved in the following months, never needed medication or other interventions for this issue.    ROS:   Review of systems is essentially negative for all systems except for any identified issues in HPI above.    Objective     BP  "118/86   Pulse 88   Temp 37.1 °C (98.7 °F)   Ht 1.6 m (5' 3\")   Wt 74.7 kg (164 lb 9.6 oz)   SpO2 96%   BMI 29.16 kg/m²      PHYSICAL EXAM    GENERAL  Well-appearing, pleasant and cooperative.  No acute distress.    HEENT  HEAD:   Normocephalic.  Atraumatic.  EYES:  No scleral icterus or conjunctival injection.  NECK:  No adenopathy.  No palpable thyroid enlargement or nodules.      LUNGS:    Clear to auscultation bilaterally.  No wheezes, rales, rhonchi.    CARDIAC:  Regular rate and rhythm.  Normal S1S2.  No murmurs/rubs/gallops.    MUSCULOSKELETAL:  No gross abnormalities.     EXTREMITIES:  No LE edema or cyanosis.      NEURO           Alert and oriented x3. No focal deficits.    PSYCH:          Affect appropriate.           Assessment/Plan   Problem List Items Addressed This Visit       Osteoarthritis of both hips - Primary    Relevant Medications    phentermine 37.5 mg capsule    Lumbar disc herniation    Relevant Medications    phentermine 37.5 mg capsule     Other Visit Diagnoses       Overweight (BMI 25.0-29.9)        Relevant Medications    phentermine 37.5 mg capsule          Counseling:   Medication education:    Education: The patient is counseled regarding potential side effects of all new medications.    Understanding:  Patient expressed understanding.    Adherence:  No barriers to adherence identified.      Miriam Loya MD    "

## 2024-10-16 NOTE — PATIENT INSTRUCTIONS
Thank you for coming to see me today.    New prescription for phentermine sent to [facility].  Take daily as prescribed.    Follow-up in 1 month for weight management/medication review, sooner if needed.

## 2024-10-20 PROCEDURE — RXMED WILLOW AMBULATORY MEDICATION CHARGE

## 2024-10-22 ENCOUNTER — PHARMACY VISIT (OUTPATIENT)
Dept: PHARMACY | Facility: CLINIC | Age: 39
End: 2024-10-22
Payer: COMMERCIAL

## 2024-10-31 ENCOUNTER — HOSPITAL ENCOUNTER (OUTPATIENT)
Dept: RADIOLOGY | Facility: HOSPITAL | Age: 39
Discharge: HOME | End: 2024-10-31
Payer: COMMERCIAL

## 2024-10-31 DIAGNOSIS — R92.8 OTHER ABNORMAL AND INCONCLUSIVE FINDINGS ON DIAGNOSTIC IMAGING OF BREAST: ICD-10-CM

## 2024-10-31 PROCEDURE — 76642 ULTRASOUND BREAST LIMITED: CPT | Mod: RIGHT SIDE | Performed by: RADIOLOGY

## 2024-10-31 PROCEDURE — 76642 ULTRASOUND BREAST LIMITED: CPT | Mod: RT

## 2024-11-13 DIAGNOSIS — M51.26 LUMBAR DISC HERNIATION: ICD-10-CM

## 2024-11-13 DIAGNOSIS — F51.01 PRIMARY INSOMNIA: ICD-10-CM

## 2024-11-13 DIAGNOSIS — M16.0 PRIMARY OSTEOARTHRITIS OF BOTH HIPS: ICD-10-CM

## 2024-11-13 DIAGNOSIS — E66.3 OVERWEIGHT (BMI 25.0-29.9): ICD-10-CM

## 2024-11-13 RX ORDER — PHENTERMINE HYDROCHLORIDE 37.5 MG/1
37.5 CAPSULE ORAL
Qty: 30 CAPSULE | Refills: 0 | OUTPATIENT
Start: 2024-11-13 | End: 2024-12-13

## 2024-11-13 RX ORDER — ZOLPIDEM TARTRATE 10 MG/1
10 TABLET ORAL NIGHTLY PRN
Qty: 30 TABLET | Refills: 2 | Status: SHIPPED | OUTPATIENT
Start: 2024-11-13 | End: 2025-02-11

## 2024-11-14 ENCOUNTER — OFFICE VISIT (OUTPATIENT)
Dept: PRIMARY CARE | Facility: CLINIC | Age: 39
End: 2024-11-14
Payer: COMMERCIAL

## 2024-11-14 ENCOUNTER — PHARMACY VISIT (OUTPATIENT)
Dept: PHARMACY | Facility: CLINIC | Age: 39
End: 2024-11-14
Payer: COMMERCIAL

## 2024-11-14 VITALS
TEMPERATURE: 96.9 F | HEIGHT: 63 IN | DIASTOLIC BLOOD PRESSURE: 76 MMHG | WEIGHT: 152 LBS | SYSTOLIC BLOOD PRESSURE: 118 MMHG | HEART RATE: 78 BPM | OXYGEN SATURATION: 97 % | BODY MASS INDEX: 26.93 KG/M2

## 2024-11-14 DIAGNOSIS — M51.26 LUMBAR DISC HERNIATION: ICD-10-CM

## 2024-11-14 DIAGNOSIS — M16.0 PRIMARY OSTEOARTHRITIS OF BOTH HIPS: ICD-10-CM

## 2024-11-14 DIAGNOSIS — E66.3 OVERWEIGHT (BMI 25.0-29.9): ICD-10-CM

## 2024-11-14 PROCEDURE — 99213 OFFICE O/P EST LOW 20 MIN: CPT | Performed by: INTERNAL MEDICINE

## 2024-11-14 PROCEDURE — 1036F TOBACCO NON-USER: CPT | Performed by: INTERNAL MEDICINE

## 2024-11-14 PROCEDURE — RXMED WILLOW AMBULATORY MEDICATION CHARGE

## 2024-11-14 PROCEDURE — 3008F BODY MASS INDEX DOCD: CPT | Performed by: INTERNAL MEDICINE

## 2024-11-14 RX ORDER — PHENTERMINE HYDROCHLORIDE 37.5 MG/1
37.5 CAPSULE ORAL
Qty: 30 CAPSULE | Refills: 0 | Status: SHIPPED | OUTPATIENT
Start: 2024-11-14 | End: 2024-12-14

## 2024-11-14 ASSESSMENT — ENCOUNTER SYMPTOMS
HEADACHES: 0
PALPITATIONS: 0
SHORTNESS OF BREATH: 0
VOMITING: 0
CHILLS: 0
FEVER: 0
ABDOMINAL PAIN: 0
DIZZINESS: 0
NAUSEA: 0

## 2024-11-14 ASSESSMENT — PAIN SCALES - GENERAL: PAINLEVEL_OUTOF10: 4

## 2024-11-14 ASSESSMENT — PATIENT HEALTH QUESTIONNAIRE - PHQ9
1. LITTLE INTEREST OR PLEASURE IN DOING THINGS: NOT AT ALL
SUM OF ALL RESPONSES TO PHQ9 QUESTIONS 1 AND 2: 0
2. FEELING DOWN, DEPRESSED OR HOPELESS: NOT AT ALL

## 2024-11-14 NOTE — PROGRESS NOTES
"Subjective   Patient ID: Leeann Mark is a 39 y.o. female who presents for Weight Check.    This is a 39 y.o. presenting for weight mgmt.  Pt  states that she is exercising 3 times/week. She can no longer afford a , so she has been exercising on her own.  In reviewing pt's chart, there is no record of BMI of 30 or greater. She was likely started on weight loss medication due to being overweight and having co-morbidities. Pt states that her goal weight is 140-145.  She has chronic low back pain and hip pain.          Review of Systems   Constitutional:  Negative for chills and fever.   Respiratory:  Negative for shortness of breath.    Cardiovascular:  Negative for chest pain and palpitations.   Gastrointestinal:  Negative for abdominal pain, nausea and vomiting.   Neurological:  Negative for dizziness and headaches.       Objective   /76   Pulse 78   Temp 36.1 °C (96.9 °F) (Temporal)   Ht 1.6 m (5' 3\")   Wt 68.9 kg (152 lb)   SpO2 97%   BMI 26.93 kg/m²     Physical Exam  Vitals reviewed.   Constitutional:       General: She is not in acute distress.     Appearance: Normal appearance. She is not ill-appearing.   Cardiovascular:      Rate and Rhythm: Normal rate and regular rhythm.      Heart sounds: Normal heart sounds.   Pulmonary:      Effort: Pulmonary effort is normal.      Breath sounds: Normal breath sounds.   Neurological:      General: No focal deficit present.      Mental Status: She is alert and oriented to person, place, and time.   Psychiatric:         Mood and Affect: Mood normal.         Assessment/Plan   Diagnoses and all orders for this visit:  Primary osteoarthritis of both hips  -     phentermine 37.5 mg capsule; Take 1 capsule (37.5 mg) by mouth once daily in the morning. Take before meals.  Lumbar disc herniation  -     phentermine 37.5 mg capsule; Take 1 capsule (37.5 mg) by mouth once daily in the morning. Take before meals.  Overweight (BMI 25.0-29.9)  -     " phentermine 37.5 mg capsule; Take 1 capsule (37.5 mg) by mouth once daily in the morning. Take before meals.  Other orders  -     Follow Up In Primary Care - Established; Future

## 2024-11-18 ENCOUNTER — APPOINTMENT (OUTPATIENT)
Dept: PRIMARY CARE | Facility: CLINIC | Age: 39
End: 2024-11-18
Payer: COMMERCIAL

## 2024-11-20 PROCEDURE — RXMED WILLOW AMBULATORY MEDICATION CHARGE

## 2024-11-21 ENCOUNTER — PHARMACY VISIT (OUTPATIENT)
Dept: PHARMACY | Facility: CLINIC | Age: 39
End: 2024-11-21
Payer: COMMERCIAL

## 2024-12-03 ENCOUNTER — TELEPHONE (OUTPATIENT)
Dept: PRIMARY CARE | Facility: CLINIC | Age: 39
End: 2024-12-03
Payer: COMMERCIAL

## 2024-12-03 NOTE — TELEPHONE ENCOUNTER
Pt scheduled follow up with Tonya Arteaga 12/17/24 but also needs her [place] updated/completed since PCP is still on maternity leave. Wants to know if this is something that can be completed at this appointment. Please advise.

## 2024-12-13 ENCOUNTER — PHARMACY VISIT (OUTPATIENT)
Dept: PHARMACY | Facility: CLINIC | Age: 39
End: 2024-12-13
Payer: COMMERCIAL

## 2024-12-13 PROCEDURE — RXMED WILLOW AMBULATORY MEDICATION CHARGE

## 2024-12-16 ENCOUNTER — APPOINTMENT (OUTPATIENT)
Dept: PRIMARY CARE | Facility: CLINIC | Age: 39
End: 2024-12-16
Payer: COMMERCIAL

## 2024-12-17 ENCOUNTER — APPOINTMENT (OUTPATIENT)
Dept: PRIMARY CARE | Facility: CLINIC | Age: 39
End: 2024-12-17
Payer: COMMERCIAL

## 2024-12-19 PROCEDURE — RXMED WILLOW AMBULATORY MEDICATION CHARGE

## 2024-12-20 ENCOUNTER — PHARMACY VISIT (OUTPATIENT)
Dept: PHARMACY | Facility: CLINIC | Age: 39
End: 2024-12-20
Payer: COMMERCIAL

## 2024-12-31 ENCOUNTER — APPOINTMENT (OUTPATIENT)
Dept: PRIMARY CARE | Facility: CLINIC | Age: 39
End: 2024-12-31
Payer: COMMERCIAL

## 2024-12-31 VITALS
BODY MASS INDEX: 26.91 KG/M2 | HEART RATE: 95 BPM | SYSTOLIC BLOOD PRESSURE: 120 MMHG | OXYGEN SATURATION: 98 % | DIASTOLIC BLOOD PRESSURE: 80 MMHG | HEIGHT: 63 IN | WEIGHT: 151.9 LBS | TEMPERATURE: 98.3 F

## 2024-12-31 DIAGNOSIS — G89.29 OTHER CHRONIC PAIN: ICD-10-CM

## 2024-12-31 DIAGNOSIS — K58.0 IRRITABLE BOWEL SYNDROME WITH DIARRHEA: Primary | ICD-10-CM

## 2024-12-31 PROBLEM — K57.32 DIVERTICULITIS LARGE INTESTINE W/O PERFORATION OR ABSCESS W/O BLEEDING: Status: RESOLVED | Noted: 2023-09-22 | Resolved: 2024-12-31

## 2024-12-31 PROBLEM — S20.219A CONTUSION OF CHEST: Status: RESOLVED | Noted: 2023-09-22 | Resolved: 2024-12-31

## 2024-12-31 PROBLEM — B94.8 SEQUELAE OF OTHER SPECIFIED INFECTIOUS AND PARASITIC DISEASES: Status: RESOLVED | Noted: 2023-09-22 | Resolved: 2024-12-31

## 2024-12-31 PROBLEM — K52.9 COLITIS: Status: RESOLVED | Noted: 2023-09-22 | Resolved: 2024-12-31

## 2024-12-31 PROCEDURE — 99214 OFFICE O/P EST MOD 30 MIN: CPT | Performed by: NURSE PRACTITIONER

## 2024-12-31 PROCEDURE — 3008F BODY MASS INDEX DOCD: CPT | Performed by: NURSE PRACTITIONER

## 2024-12-31 PROCEDURE — 1036F TOBACCO NON-USER: CPT | Performed by: NURSE PRACTITIONER

## 2024-12-31 ASSESSMENT — PATIENT HEALTH QUESTIONNAIRE - PHQ9
2. FEELING DOWN, DEPRESSED OR HOPELESS: NOT AT ALL
SUM OF ALL RESPONSES TO PHQ9 QUESTIONS 1 AND 2: 0
1. LITTLE INTEREST OR PLEASURE IN DOING THINGS: NOT AT ALL

## 2024-12-31 ASSESSMENT — PAIN SCALES - GENERAL: PAINLEVEL_OUTOF10: 3

## 2024-12-31 NOTE — PROGRESS NOTES
"Subjective   Patient ID: Leeann Mark is a 39 y.o. female who presents for fmla and weight loss.    Presents for weight loss management and to complete FMLA    Has been taking phentermine for the past 2 months  Starting weight 163# BMI 28.8  Today's weight 151# BMI 26.7    She has been incorporating watching what she eats and increased activity to help with her weight loss.  She denies palpitations or difficulty sleeping and due to the phentermine  She also needs paperwork completed for her FMLA.  She does have ongoing IBS and chronic pain of which she takes intermittent FMLA.  FMLA paperwork must be completed every 6 months.         Review of Systems   All other systems reviewed and are negative.      Objective   /80   Pulse 95   Temp 36.8 °C (98.3 °F)   Ht 1.6 m (5' 3\")   Wt 68.9 kg (151 lb 14.4 oz)   SpO2 98%   BMI 26.91 kg/m²     Physical Exam  Vitals and nursing note reviewed.   Constitutional:       General: She is not in acute distress.     Appearance: Normal appearance.   HENT:      Head: Normocephalic and atraumatic.      Right Ear: External ear normal.      Left Ear: External ear normal.      Nose: Nose normal.      Mouth/Throat:      Mouth: Mucous membranes are moist.      Pharynx: Oropharynx is clear.   Eyes:      Extraocular Movements: Extraocular movements intact.      Conjunctiva/sclera: Conjunctivae normal.      Pupils: Pupils are equal, round, and reactive to light.   Neck:      Vascular: No carotid bruit.   Cardiovascular:      Rate and Rhythm: Normal rate and regular rhythm.      Pulses: Normal pulses.      Heart sounds: Normal heart sounds.   Pulmonary:      Effort: Pulmonary effort is normal.      Breath sounds: Normal breath sounds.   Musculoskeletal:      Cervical back: Normal range of motion and neck supple.   Lymphadenopathy:      Cervical: No cervical adenopathy.   Skin:     General: Skin is warm and dry.      Capillary Refill: Capillary refill takes less than 2 seconds. "   Neurological:      Mental Status: She is alert and oriented to person, place, and time.   Psychiatric:         Mood and Affect: Mood normal.         Behavior: Behavior normal.         Thought Content: Thought content normal.         Judgment: Judgment normal.         Assessment/Plan   Problem List Items Addressed This Visit             ICD-10-CM    Other chronic pain G89.29     Stable  Follows pain management  Completed FMLA paperwork         Irritable bowel syndrome with diarrhea - Primary K58.0     Stable  Will have flareups  Completed FMLA paperwork for flareups         BMI 26.0-26.9,adult Z68.26     - eat off the smaller plate (like the salad plate)  - half the plate should be vegetables, 1/4 protein, 1/4 carbs - for lunch and dinner  -  discussed dietary changes including proper protein intake, increase vegetable intake, fruit intake, low carbohydrate intake, and no processed food intake.  - discussed meal prepping    put your fork down between bites  - drink at least 64 oz of water day.  do not consume large amounts of water with your meal  - do not drink sugary drinks such as pop or specialty coffee drinks  -  eat your vegetables and protein first.  Have vegetables at lunch and dinner  - discussed with patient to increase activity 4 days a week preferably 5. Exercise should be done 5 days a week including walking for 20-30 minutes each day. Work up to this goal.  If you have not been exercising then start with a 10-15 minute walk most days of the week -  keep log of calorie intake and food intake and bring with you to next appointment.You can use an jacques on your phone such as my fitness pal.  - discussed with patient using activity trackers such as a fit bit. log  activity daily and bring  activity log at next visit  - increase your protein intake - should have at least 5-6 servings of protein per day  - decrease carb intake - discussed carb serving size and to read packages  - limit carb servings to 4  servings a day  -1500-calorie meal plan get patient and reviewed  - has decreased BMI from 28.8-26.7  I discussed with her that her BMI is too low to continue phentermine

## 2024-12-31 NOTE — PATIENT INSTRUCTIONS
You have done great with your weight loss!  Your BMI is too low to continue phentermine   I completed your FMLA paperwork for you

## 2025-01-05 ENCOUNTER — OFFICE VISIT (OUTPATIENT)
Dept: URGENT CARE | Age: 40
End: 2025-01-05
Payer: COMMERCIAL

## 2025-01-05 ENCOUNTER — ANCILLARY PROCEDURE (OUTPATIENT)
Dept: URGENT CARE | Age: 40
End: 2025-01-05
Payer: COMMERCIAL

## 2025-01-05 VITALS
BODY MASS INDEX: 26.75 KG/M2 | OXYGEN SATURATION: 96 % | WEIGHT: 151 LBS | HEIGHT: 63 IN | HEART RATE: 94 BPM | TEMPERATURE: 96.3 F | DIASTOLIC BLOOD PRESSURE: 87 MMHG | SYSTOLIC BLOOD PRESSURE: 137 MMHG

## 2025-01-05 DIAGNOSIS — T14.90XA INJURY: ICD-10-CM

## 2025-01-05 DIAGNOSIS — S63.601A SPRAIN OF RIGHT THUMB, UNSPECIFIED SITE OF DIGIT, INITIAL ENCOUNTER: Primary | ICD-10-CM

## 2025-01-05 NOTE — PROGRESS NOTES
Subjective   Patient ID: Leeann Mark is a 39 y.o. female. They present today with a chief complaint of Injury (Workers comp injury. A patient of hers grabbed right thumb and twisted a few days ago).    History of Present Illness  Patient is a pleasant 39-year-old white female, no significant past medical history, presented to clinic as [facility] for thumb injury.  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  States this happened about 5 days ago.  She presented to clinic out of concern for persistent and worsening pain associated with the right thumb.  States she did buy a thumb spica splint which she has been wearing that does provide some relief of her pain however endorses persistent pain with any range of motion.  Denies any numbness or tingling.  No further complaints at this time.          Past Medical History  Allergies as of 01/05/2025 - Reviewed 01/05/2025   Allergen Reaction Noted    Aripiprazole Other 09/22/2023    Azithromycin Anaphylaxis and Unknown 09/22/2023    Lamotrigine Other 09/22/2023    Metoclopramide Unknown and Palpitations 11/14/2024    Metoclopramide hcl Palpitations and Other 09/22/2023    Shellfish containing products Anaphylaxis and Unknown 09/22/2023    Sulfa (sulfonamide antibiotics) Anaphylaxis 09/22/2023    Influenza virus vaccines Other 09/22/2023    Mirapex [pramipexole] Other and Unknown 09/22/2023    Other Blurred vision 09/22/2023    Prochlorperazine Unknown 09/22/2023       (Not in a hospital admission)         Past Medical History:   Diagnosis Date    Chronic pain     Colitis 09/22/2023    Contusion of chest 09/22/2023    Diverticulitis large intestine w/o perforation or abscess w/o bleeding 09/22/2023    Diverticulosis     Long COVID     Sequelae of other specified infectious and parasitic diseases 09/22/2023       Past Surgical History:   Procedure Laterality Date    BI US GUIDED  "BREAST LOCALIZATION AND BIOPSY LEFT Left 12/9/2016    BI US GUIDED BREAST LOCALIZATION AND BIOPSY LEFT [facility]    MR HEAD ANGIO WO IV CONTRAST  2/3/2021    MR HEAD ANGIO WO IV CONTRAST [facility]    MR HEAD ANGIO WO IV CONTRAST  2/3/2021    MR HEAD ANGIO WO IV CONTRAST [facility]    OTHER SURGICAL HISTORY  10/31/2022    No history of surgery        reports that she has quit smoking. Her smoking use included cigarettes. She has been exposed to tobacco smoke. She has never used smokeless tobacco. She reports that she does not drink alcohol and does not use drugs.    Review of Systems  Review of Systems                               Objective    Vitals:    01/05/25 1432   BP: 137/87   Pulse: 94   Temp: 35.7 °C (96.3 °F)   TempSrc: Temporal   SpO2: 96%   Weight: 68.5 kg (151 lb)   Height: 1.6 m (5' 3\")     No LMP recorded. Patient is postmenopausal.    Physical Exam  General: Vitals Noted. No distress. Normocephalic.     HEENT: TMs normal, EOMI, normal conjunctiva, patent nares, Normal OP    Neck: Supple with no adenopathy.     Cardiac: Regular Rate and Rhythm. No murmur.     Pulmonary: Equal breath sounds bilaterally. No wheezes, rhonchi, or rales.    Abdomen: Soft, non-tender, with normal bowel sounds.     Musculoskeletal: Evaluation of the right thumb does reveal tenderness to palpation throughout the proximal thumb and into the first metacarpal.  There is no associated swelling or ecchymosis or erythema.  Is neurovascular intact distally cap refill less than 2 seconds.  Full strength with thumb opposition.  Otherwise moves all extremities, no effusion, no edema.     Skin: No obvious rashes.  Procedures    Point of Care Test & Imaging Results from this visit    XR thumb right MIN 2 views    Result Date: 1/5/2025  Interpreted By:  Rony Smith, STUDY: XR THUMB RIGHT MIN 2 VIEWS;  1/5/2025 2:27 pm   INDICATION: Signs/Symptoms:injury.   COMPARISON: None.   ACCESSION NUMBER(S): " [id number]   ORDERING CLINICIAN: RUBEN JOHNSTON   FINDINGS: No acute fracture or dislocation.       No acute osseous injury of the right thumb.   MACRO: None   Signed by: Rony Smith 1/5/2025 2:41 PM Dictation workstation:   [id number]     Diagnostic study results (if any) were reviewed by Ruben Johnston PA-C.    Assessment/Plan   Allergies, medications, history, and pertinent labs/EKGs/Imaging reviewed by Ruben Johnston PA-C.     Medical Decision Making   Patient was seen eval in the clinic for complaint of right thumb injury.  On exam patient is nontoxic well-appearing respite comfortably no acute distress.  Vital signs are stable, afebrile.  Chest is clear, heart is regular, belly is diffusely soft and nontender.  Evaluation of the right thumb as above concerning for fracture versus strain.  X-ray imaging was obtained which reveals no underlying acute osseous abnormalities.  Patient will be advised to continue to wear thumb spica splint.  Will have her follow-up with employee health on Wednesday the day before she returns back to work.  Advise she ice and elevate and use on ibuprofen every 6 hours as needed.  Discharged home at this time.  Reviewed my impression, plan, strict return precautions with the patient.  She expresses understanding and agreement plan of care.    Orders and Diagnoses  Diagnoses and all orders for this visit:  Injury  -     XR thumb right MIN 2 views; Future        Medical Admin Record      Follow Up Instructions  No follow-ups on file.    Patient disposition: Home    Electronically signed by Ruben Johnston PA-C  2:45 PM

## 2025-01-09 PROBLEM — R06.02 SHORTNESS OF BREATH: Status: RESOLVED | Noted: 2023-09-22 | Resolved: 2025-01-09

## 2025-01-09 PROBLEM — T17.208A FOREIGN BODY IN PHARYNX: Status: RESOLVED | Noted: 2023-09-22 | Resolved: 2025-01-09

## 2025-01-09 PROBLEM — I20.9 ANGINA PECTORIS: Status: RESOLVED | Noted: 2023-09-22 | Resolved: 2025-01-09

## 2025-01-09 NOTE — ASSESSMENT & PLAN NOTE
- eat off the smaller plate (like the salad plate)  - half the plate should be vegetables, 1/4 protein, 1/4 carbs - for lunch and dinner  -  discussed dietary changes including proper protein intake, increase vegetable intake, fruit intake, low carbohydrate intake, and no processed food intake.  - discussed meal prepping    put your fork down between bites  - drink at least 64 oz of water day.  do not consume large amounts of water with your meal  - do not drink sugary drinks such as pop or specialty coffee drinks  -  eat your vegetables and protein first.  Have vegetables at lunch and dinner  - discussed with patient to increase activity 4 days a week preferably 5. Exercise should be done 5 days a week including walking for 20-30 minutes each day. Work up to this goal.  If you have not been exercising then start with a 10-15 minute walk most days of the week -  keep log of calorie intake and food intake and bring with you to next appointment.You can use an jacques on your phone such as my fitness pal.  - discussed with patient using activity trackers such as a fit bit. log  activity daily and bring  activity log at next visit  - increase your protein intake - should have at least 5-6 servings of protein per day  - decrease carb intake - discussed carb serving size and to read packages  - limit carb servings to 4 servings a day  -1500-calorie meal plan get patient and reviewed  - has decreased BMI from 28.8-26.7  I discussed with her that her BMI is too low to continue phentermine

## 2025-01-17 PROCEDURE — RXMED WILLOW AMBULATORY MEDICATION CHARGE

## 2025-01-18 ENCOUNTER — PHARMACY VISIT (OUTPATIENT)
Dept: PHARMACY | Facility: CLINIC | Age: 40
End: 2025-01-18
Payer: COMMERCIAL

## 2025-01-20 PROCEDURE — RXMED WILLOW AMBULATORY MEDICATION CHARGE

## 2025-01-29 ENCOUNTER — PHARMACY VISIT (OUTPATIENT)
Dept: PHARMACY | Facility: CLINIC | Age: 40
End: 2025-01-29
Payer: COMMERCIAL

## 2025-02-06 ENCOUNTER — PHARMACY VISIT (OUTPATIENT)
Dept: PHARMACY | Facility: CLINIC | Age: 40
End: 2025-02-06
Payer: COMMERCIAL

## 2025-02-06 PROCEDURE — RXMED WILLOW AMBULATORY MEDICATION CHARGE

## 2025-02-08 DIAGNOSIS — F51.01 PRIMARY INSOMNIA: ICD-10-CM

## 2025-02-10 RX ORDER — ZOLPIDEM TARTRATE 10 MG/1
10 TABLET ORAL NIGHTLY PRN
Qty: 30 TABLET | Refills: 2 | Status: SHIPPED | OUTPATIENT
Start: 2025-02-10 | End: 2025-05-11

## 2025-02-14 ENCOUNTER — PHARMACY VISIT (OUTPATIENT)
Dept: PHARMACY | Facility: CLINIC | Age: 40
End: 2025-02-14
Payer: COMMERCIAL

## 2025-02-14 PROCEDURE — RXMED WILLOW AMBULATORY MEDICATION CHARGE

## 2025-02-25 ENCOUNTER — APPOINTMENT (OUTPATIENT)
Dept: PRIMARY CARE | Facility: CLINIC | Age: 40
End: 2025-02-25
Payer: COMMERCIAL

## 2025-03-05 DIAGNOSIS — G89.29 OTHER CHRONIC PAIN: ICD-10-CM

## 2025-03-06 PROCEDURE — RXMED WILLOW AMBULATORY MEDICATION CHARGE

## 2025-03-06 RX ORDER — GABAPENTIN 300 MG/1
300 CAPSULE ORAL 3 TIMES DAILY
Qty: 270 CAPSULE | Refills: 1 | Status: SHIPPED | OUTPATIENT
Start: 2025-03-06 | End: 2025-06-04

## 2025-03-11 ENCOUNTER — PHARMACY VISIT (OUTPATIENT)
Dept: PHARMACY | Facility: CLINIC | Age: 40
End: 2025-03-11
Payer: COMMERCIAL

## 2025-03-15 PROCEDURE — RXMED WILLOW AMBULATORY MEDICATION CHARGE

## 2025-03-17 ENCOUNTER — PHARMACY VISIT (OUTPATIENT)
Dept: PHARMACY | Facility: CLINIC | Age: 40
End: 2025-03-17
Payer: COMMERCIAL

## 2025-03-31 ENCOUNTER — APPOINTMENT (OUTPATIENT)
Dept: SLEEP MEDICINE | Facility: CLINIC | Age: 40
End: 2025-03-31
Payer: COMMERCIAL

## 2025-03-31 VITALS
OXYGEN SATURATION: 97 % | WEIGHT: 150 LBS | HEART RATE: 92 BPM | SYSTOLIC BLOOD PRESSURE: 104 MMHG | HEIGHT: 63 IN | DIASTOLIC BLOOD PRESSURE: 72 MMHG | BODY MASS INDEX: 26.58 KG/M2

## 2025-03-31 DIAGNOSIS — Z79.899 MEDICATION MANAGEMENT: Primary | ICD-10-CM

## 2025-03-31 DIAGNOSIS — F51.01 PRIMARY INSOMNIA: ICD-10-CM

## 2025-03-31 DIAGNOSIS — G47.26 SHIFT WORK SLEEP DISORDER: ICD-10-CM

## 2025-03-31 PROCEDURE — 99214 OFFICE O/P EST MOD 30 MIN: CPT | Performed by: INTERNAL MEDICINE

## 2025-03-31 PROCEDURE — 3008F BODY MASS INDEX DOCD: CPT | Performed by: INTERNAL MEDICINE

## 2025-03-31 PROCEDURE — 1036F TOBACCO NON-USER: CPT | Performed by: INTERNAL MEDICINE

## 2025-03-31 ASSESSMENT — SLEEP AND FATIGUE QUESTIONNAIRES
HOW LIKELY ARE YOU TO NOD OFF OR FALL ASLEEP WHILE SITTING AND READING: WOULD NEVER DOZE
DIFFICULTY_FALLING_ASLEEP: MODERATE
SITING INACTIVE IN A PUBLIC PLACE LIKE A CLASS ROOM OR A MOVIE THEATER: WOULD NEVER DOZE
HOW LIKELY ARE YOU TO NOD OFF OR FALL ASLEEP IN A CAR, WHILE STOPPED FOR A FEW MINUTES IN TRAFFIC: WOULD NEVER DOZE
WORRIED_DISTRESSED_DUE_TO_SLEEP: A LITTLE
HOW LIKELY ARE YOU TO NOD OFF OR FALL ASLEEP WHILE LYING DOWN TO REST IN THE AFTERNOON WHEN CIRCUMSTANCES PERMIT: SLIGHT CHANCE OF DOZING
HOW LIKELY ARE YOU TO NOD OFF OR FALL ASLEEP WHILE WATCHING TV: WOULD NEVER DOZE
ESS-CHAD TOTAL SCORE: 1
SLEEP_PROBLEM_INTERFERES_DAILY_ACTIVITIES: A LITTLE
SATISFACTION_WITH_CURRENT_SLEEP_PATTERN: SATISFIED
WAKING_TOO_EARLY: MODERATE
HOW LIKELY ARE YOU TO NOD OFF OR FALL ASLEEP WHILE SITTING AND TALKING TO SOMEONE: WOULD NEVER DOZE
HOW LIKELY ARE YOU TO NOD OFF OR FALL ASLEEP WHILE SITTING QUIETLY AFTER LUNCH WITHOUT ALCOHOL: WOULD NEVER DOZE
DIFFICULTY_STAYING_ASLEEP: MODERATE
HOW LIKELY ARE YOU TO NOD OFF OR FALL ASLEEP WHEN YOU ARE A PASSENGER IN A CAR FOR AN HOUR WITHOUT A BREAK: WOULD NEVER DOZE
SLEEP_PROBLEM_NOTICEABLE_TO_OTHERS: A LITTLE

## 2025-03-31 ASSESSMENT — PAIN SCALES - GENERAL: PAINLEVEL_OUTOF10: 0-NO PAIN

## 2025-03-31 NOTE — ASSESSMENT & PLAN NOTE
There is an opportunity for behavioral management of her insomnia. She has issues with having TV on during sleep, may be drinking caffeine too early upon waking. Not able to get in a nap before her shift, leading to insufficient sleep.  Gave her resources for CBT-I

## 2025-03-31 NOTE — PROGRESS NOTES
Patient: Leeann Mark    [id number]  : 1985 -- AGE 39 y.o.    Provider: Gerardo Dougherty MD     Location [facility]   Service Date: 3/31/2025              [facility]  Followup Visit Note        HISTORY OF PRESENT ILLNESS     The patient's referring provider is: No ref. provider found    HISTORY OF PRESENT ILLNESS   Leeann Mark is a 39 y.o. female who presents to a [facility] for followup.     The patient  has a past medical history of Angina pectoris (2023), Chronic pain, Colitis (2023), Contusion of chest (2023), Diverticulitis large intestine w/o perforation or abscess w/o bleeding (2023), Diverticulosis, Foreign body in pharynx (2023), Long COVID, Sequelae of other specified infectious and parasitic diseases (2023), and Shortness of breath (2023)..      CURRENT HISTORY    On today's visit, the patient reports Insomnia       Sleep Schedule:   She gets in bed at 10 AM  Tries to sleep at 11 AM  her sleep latency is 1 hour  She has 2 awakenings per night that last for 30 minutes  When unable to sleep gets out of bed   Activities when unable to sleep upon waking include:and does laundry or something constructive  She wakes by  3 PM  Sets an alarm for 5 PM   She gets about 4-5 hours of sleep per night.    Non Working days:   She gets in bed at 7-8 AM  Tries to sleep at 8-9 AM  her sleep latency is 1 hour  She has 2 awakenings from sleep and unable to get back to sleep minutes  When unable to sleep gets out of bed   Activities when unable to sleep upon waking include: and does laundry or something constructive  She wakes by  1 PM  Does not set an alarm   She gets about 4-5 hours of sleep per night.    Activities prior to bed:  Stops using electronics at: TV on the entire day  Taking sleep medication at 10 AM  Last caffeinated beverage at 3 AM, nonworking days will have coffee at 1  or 2 PM, and 5 PM  Denies nicotine   Drinks alcohol   Uses cannibis oil for nerve pain with no THC in it  Is blue light blockers during her work shift at 3 AM    Activities upon waking  Is getting out of bed upon waking  Is not using an SAD light  Is not eating breakfast  Is exercising typically exercising from 1 - 3 PM  She can get caffeine withdrawal headaches    Outside noise affecting sleep    RLS Followup:   [id number]: none.    Naps:   No. Naps      ESS: 1     OARRS:  Gerardo Dougherty MD on 3/31/2025  9:49 AM  I have personally reviewed the OARRS report for Leeann Mark. I have considered the risks of abuse, dependence, addiction and diversion and I believe that it is clinically appropriate for Leeann Mark to be prescribed this medication    Is the patient prescribed a combination of a benzodiazepine and opioid?  No    Last Urine Drug Screen / ordered today: Yes  No results found for this or any previous visit (from the past 8760 hours).  N/A        Controlled Substance Agreement:  Date of the Last Agreement: 03/31/25   Reviewed Controlled Substance Agreement including but not limited to the benefits, risks, and alternatives to treatment with a Controlled Substance medication(s).    Sleep Aids:   What is the patient's goal of therapy? To improve sleep onset during shift work  Is this being achieved with current treatment? somewhat    Activities of Daily Living:   Is your overall impression that this patient is benefiting (symptom reduction outweighs side effects) from sleep aid therapy? Yes     1. Physical Functioning: Better  2. Family Relationship: Better  3. Social Relationship: Better  4. Mood: Better  5. Sleep Patterns: Better  6. Overall Function: Better     REVIEW OF SYSTEMS     REVIEW OF SYSTEMS  Review of Systems      ALLERGIES AND MEDICATIONS     ALLERGIES  Allergies   Allergen Reactions    Aripiprazole Other     tics    Azithromycin Anaphylaxis and Unknown    Lamotrigine Other     Tics, twitching  "   Metoclopramide Unknown and Palpitations    Metoclopramide Hcl Palpitations and Other     \"Bronchospasm\"    Shellfish Containing Products Anaphylaxis and Unknown     seafood    Sulfa (Sulfonamide Antibiotics) Anaphylaxis    Influenza Virus Vaccines Other     paralysis    Mirapex [Pramipexole] Other and Unknown     Vomiting      Other Blurred vision     Sulfacetamide Sod-Pred  Eye discharge    Prochlorperazine Unknown       MEDICATIONS: She has a current medication list which includes the following prescription(s): acetaminophen - Tylenol Arthritis Pain, acetylcysteine - Nac 600, amitriptyline - Take 2 tablets by mouth every day at bedtime, cider vinegar - Take 2 capsules by mouth once daily, arnica flower extract - Take 4 capsules by mouth every 6 hours if needed, ascorbic acid - Vitamin C, b complex vitamins - Take 1 capsule by mouth once daily, cholecalciferol - Take 1 tablet (2,000 Units) by mouth once daily, clindamycin phosphate - 1 Application once daily, cyanocobalamin (vitamin b-12) - once every 24 hours. 1250 mcg liquid Orally Once a day, dicyclomine - Take 2 capsules (20 mg) by mouth 3 times a day, epinephrine - Inject 0.3 mg (0.3 mL) intramuscularly every 5 to 15 minutes As Needed for Anaphylaxis; do not exceed 3 doses per episode, gabapentin - Take 1 capsule (300 mg) by mouth 3 times a day, glucosam/chond-msm1/c/tyree/bor - 3 times a day. As directed, lactobacillus acidophilus - Probiotic, lidocaine - once daily. As directed, medical cannabis - CBD Oil, melatonin - Take 1 tablet (3 mg) by mouth as needed at bedtime, mv,calcium,min/iron/folic/vitk - As directed oral, omega-3/dha/epa/fish oil - Take 2 capsules by mouth once daily, spironolactone - Take 3 tablets (150 mg) by mouth once daily, sucralfate - Take 30 mL (3 g) by mouth 4 times a day as needed, and zolpidem - TAKE 1 TABLET BY MOUTH EVERY DAY AT BEDTIME AS NEEDED FOR SLEEP.    PAST MEDICAL HISTORY : She  has a past medical history of Angina " "pectoris (09/22/2023), Chronic pain, Colitis (09/22/2023), Contusion of chest (09/22/2023), Diverticulitis large intestine w/o perforation or abscess w/o bleeding (09/22/2023), Diverticulosis, Foreign body in pharynx (09/22/2023), Long COVID, Sequelae of other specified infectious and parasitic diseases (09/22/2023), and Shortness of breath (09/22/2023).    PAST SURGICAL HISTORY: She  has a past surgical history that includes Other surgical history (10/31/2022); BI US guided breast localization and biopsy left (Left, 12/9/2016); MR angio head wo IV contrast (2/3/2021); and MR angio head wo IV contrast (2/3/2021).     FAMILY HISTORY: No changes since previous visit. Otherwise non-contributory as charted.       SOCIAL HISTORY  She  reports that she has quit smoking. Her smoking use included cigarettes. She has been exposed to tobacco smoke. She has never used smokeless tobacco. She reports that she does not drink alcohol and does not use drugs.       PHYSICAL EXAM     VITAL SIGNS: /72   Pulse 92   Ht 1.6 m (5' 3\")   Wt 68 kg (150 lb)   SpO2 97%   BMI 26.57 kg/m²      PREVIOUS WEIGHTS:  Wt Readings from Last 3 Encounters:   03/31/25 68 kg (150 lb)   01/05/25 68.5 kg (151 lb)   12/31/24 68.9 kg (151 lb 14.4 oz)       Physical Exam  PHYSICAL EXAM: GENERAL: alert pleasant and cooperative no acute distress  PSYCH EXAM: alert,oriented, in NAD with a full range of affect, normal behavior and no psychotic features      RESULTS/DATA     Iron (UG/DL)   Date Value   06/11/2021 128   11/06/2020 56     Iron Saturation (%)   Date Value   06/11/2021 36.2   11/06/2020 14.9     TIBC (UG/DL)   Date Value   06/11/2021 354   11/06/2020 375     Ferritin (NG/ML)   Date Value   06/11/2021 24   11/06/2020 11 (L)       ASSESSMENT/PLAN     Ms. Mark is a 39 y.o. female and  has a past medical history of Angina pectoris (09/22/2023), Chronic pain, Colitis (09/22/2023), Contusion of chest (09/22/2023), Diverticulitis large intestine " w/o perforation or abscess w/o bleeding (09/22/2023), Diverticulosis, Foreign body in pharynx (09/22/2023), Long COVID, Sequelae of other specified infectious and parasitic diseases (09/22/2023), and Shortness of breath (09/22/2023). She returns in followup to the [facility] for their Insomnia   .    Problem List and Orders  Problem List Items Addressed This Visit             ICD-10-CM    Shift work sleep disorder G47.26    Insomnia G47.00     There is an opportunity for behavioral management of her insomnia. She has issues with having TV on during sleep, may be drinking caffeine too early upon waking. Not able to get in a nap before her shift, leading to insufficient sleep.  Gave her resources for CBT-I          Other Visit Diagnoses         Codes    Medication management    -  Primary Z79.899    Relevant Orders    Drug Screen, Urine With Reflex to Confirmation    Zolpidem Confirmation, Urine

## 2025-03-31 NOTE — PATIENT INSTRUCTIONS
Retraining Your Brain  To associate your bed with sleep  Reasoning: After a prolonged period of sleep difficulties, many people start to associate the bed not with sleep, but with wakefulness, frustration & anxiety. There are techniques to help strengthening the association between bed and sleep, while weakening the association between bed and lying awake/trying to sleep. This is the most thoroughly researched behavioral treatment for insomnia & has decades of research support.   Activities for the middle of the night, so you don't lie awake in bed:  Look through catalogues  Update your address book or start a new one  Sort junk mail & bills (but don't start paying them)  Play solitaire with cards   Catch up on your reading (as long as it's not too stimulating)  Make a grocery list for the coming week  Create a detailed menu for dinners  De-clutter your coffee table, dining room table, kitchen countertops or desk  Create a list of activities that you'd enjoy doing on weekends & vacations  Work on a photo album or scrapbook  Fold & put away clothes  Shop for holiday, wedding or birthday gifts in catalogs  Read magazines or other light material  Make a materials list for a project around the house  Watch [employer], Embrace, or the weather channel  Organize collections of CDs or DVDs & choose some to donate or sell   Give yourself a set amount of worry time & jot down anxious thoughts your mind is giving you on a notepad  Knit or do other crafts you can stop once you feel sleepy  Read your kids' books (they are often very comforting & positive)    You have been recommended to Cognitive Behavioral Therapy for Insomnia (CBT-I). CBT-I is widely recognized as an effective treatment for a wide range of insomnias. The treatment is typically made up of a number of components including assessment, behavioral and cognitive interventions, motivational techniques and relapse prevention skills. An overwhelming amount of evidence  suggests that CBT-I is as effective as hypnotics and the newer non-benzodiazepine sleep aides in the acute treatment and is more effective than medication in the long term treatment of insomnia.     There are several approaches to CBTi:    See a Behavioral Sleep Medicine specialist for one-on-one counseling    CBT-I at  - Limited availability -   CBT-I at the [facility] - Dr. Jasbir Olguin, PsyD or Dr. Mitzi Nolasco, PhD - Phone: [phone]  Fax: [phone]. There may be a several month waiting period.  Dr. Berenice Crook - [email or web address]  - She only does telemedicine. She was formerly part of  but is in private practice and would be out-of-network  Dr. Hopkins - one on one CBT-I service www."[employer]". You may have to pay out of pocket and submit visits to your insurance for reimbursement.  Other BSM providers in [place]:  https://www.behavioralsleep.org/index.php/directory/browse-by/state?value=[place]      Do CBTi using an online platform:    There are several online platforms that are good resources, but may vary based on cost. These include:    Mita- online course via [facility]. Self-guided. One time charge to sign up: Mita ($40)  The SleepReset - online guided cognitive behavioral therapy https://[email or web address].[email or web address]/pricing ($300 for 8 weeks)  Sleepio - https://www.sleepio.com/sleepio/welcomeusint/354#1/1 (Some insurances or employers may cover - check with your HR Benefits office)  SleepEZ- Free self-guided course from the [facility] https://www.veterantraining.va.gov/insomnia/

## 2025-04-01 LAB
1OH-MIDAZOLAM UR-MCNC: NORMAL NG/ML
7AMINOCLONAZEPAM UR-MCNC: NORMAL NG/ML
A-OH ALPRAZ UR-MCNC: NORMAL NG/ML
A-OH-TRIAZOLAM UR-MCNC: NORMAL NG/ML
AMOBARBITAL UR-MCNC: NORMAL NG/ML
AMPHET UR-MCNC: NORMAL NG/ML
AMPHETAMINES UR QL: NORMAL
BARBITURATES UR QL: NORMAL
BENZODIAZ UR QL: NORMAL
BUTALBITAL UR-MCNC: NORMAL NG/ML
BZE UR QL: NORMAL
BZE UR-MCNC: NORMAL MG/L
CODEINE UR-MCNC: NORMAL NG/ML
CREAT UR-MCNC: NORMAL MG/DL
DRUG SCREEN COMMENT UR-IMP: NORMAL
EDDP UR-MCNC: NORMAL NG/ML
HYDROCODONE UR-MCNC: NORMAL UG/ML
HYDROMORPHONE UR-MCNC: NORMAL NG/ML
LORAZEPAM UR-MCNC: NORMAL NG/ML
METHADONE UR QL: NORMAL
METHADONE UR-MCNC: NORMAL UG/L
METHAMPHET UR-MCNC: NORMAL UG/ML
MORPHINE UR-MCNC: NORMAL NG/ML
NORDIAZEPAM UR-MCNC: NORMAL NG/ML
NORHYDROCODONE UR CFM-MCNC: NORMAL NG/ML
NOROXYCODONE UR CFM-MCNC: NORMAL NG/ML
OH-ETHYLFLURAZ UR-MCNC: NORMAL NG/ML
OPIATES UR QL: NORMAL
OXAZEPAM UR-MCNC: NORMAL UG/ML
OXIDANTS UR QL: NORMAL
OXYCODONE UR QL: NORMAL
OXYCODONE UR-MCNC: NORMAL NG/ML
OXYMORPHONE UR-MCNC: NORMAL NG/ML
PCP UR QL: NORMAL
PCP UR-MCNC: NORMAL UG/L
PENTOBARB UR-MCNC: NORMAL UG/ML
PH UR: NORMAL [PH]
PHENOBARB UR-MCNC: NORMAL UG/ML
QUEST ABNORMAL SPECIMEN VALIDITY TEST:: NORMAL
SECOBARBITAL UR-MCNC: NORMAL UG/ML
SP GR UR: NORMAL
TEMAZEPAM UR-MCNC: NORMAL NG/ML
THC UR QL: NORMAL
THC UR-MCNC: NORMAL NG/ML

## 2025-04-02 LAB
AMPHETAMINES UR QL: NEGATIVE NG/ML
BARBITURATES UR QL: NEGATIVE NG/ML
BENZODIAZ UR QL: NEGATIVE NG/ML
BZE UR QL: NEGATIVE NG/ML
CREAT UR-MCNC: 225.9 MG/DL
DRUG SCREEN COMMENT UR-IMP: ABNORMAL
DRUG SCREEN COMMENT UR-IMP: ABNORMAL
METHADONE UR QL: NEGATIVE NG/ML
OPIATES UR QL: NEGATIVE NG/ML
OXIDANTS UR QL: NEGATIVE MCG/ML
OXYCODONE UR QL: NEGATIVE NG/ML
PCP UR QL: NEGATIVE NG/ML
PH UR: 5.7 [PH] (ref 4.5–9)
QUEST NOTES AND COMMENTS: ABNORMAL
QUEST ZOLPIDEM: 54 NG/ML
THC UR QL: POSITIVE NG/ML
THC UR-MCNC: 8 NG/ML
ZOLPIDEM PHENYL-4-CARB UR CFM-MCNC: >2500 NG/ML

## 2025-04-14 DIAGNOSIS — Z91.013 SHELLFISH ALLERGY: Primary | ICD-10-CM

## 2025-04-14 PROCEDURE — RXMED WILLOW AMBULATORY MEDICATION CHARGE

## 2025-04-14 RX ORDER — EPINEPHRINE 0.3 MG/.3ML
INJECTION SUBCUTANEOUS
Qty: 2 EACH | Refills: 1 | Status: SHIPPED | OUTPATIENT
Start: 2025-04-14

## 2025-04-15 ENCOUNTER — PHARMACY VISIT (OUTPATIENT)
Dept: PHARMACY | Facility: CLINIC | Age: 40
End: 2025-04-15
Payer: COMMERCIAL

## 2025-04-15 PROCEDURE — RXMED WILLOW AMBULATORY MEDICATION CHARGE

## 2025-04-29 ENCOUNTER — APPOINTMENT (OUTPATIENT)
Dept: PRIMARY CARE | Facility: CLINIC | Age: 40
End: 2025-04-29
Payer: COMMERCIAL

## 2025-05-09 DIAGNOSIS — F51.01 PRIMARY INSOMNIA: ICD-10-CM

## 2025-05-09 RX ORDER — ZOLPIDEM TARTRATE 10 MG/1
10 TABLET ORAL NIGHTLY PRN
Qty: 30 TABLET | Refills: 2 | Status: CANCELLED | OUTPATIENT
Start: 2025-05-09 | End: 2025-08-07

## 2025-05-12 ENCOUNTER — PHARMACY VISIT (OUTPATIENT)
Dept: PHARMACY | Facility: CLINIC | Age: 40
End: 2025-05-12
Payer: COMMERCIAL

## 2025-05-12 DIAGNOSIS — F51.01 PRIMARY INSOMNIA: ICD-10-CM

## 2025-05-12 PROCEDURE — RXMED WILLOW AMBULATORY MEDICATION CHARGE

## 2025-05-12 RX ORDER — ZOLPIDEM TARTRATE 10 MG/1
10 TABLET ORAL NIGHTLY PRN
Qty: 30 TABLET | Refills: 2 | Status: SHIPPED | OUTPATIENT
Start: 2025-05-12 | End: 2025-08-10

## 2025-05-29 PROCEDURE — RXMED WILLOW AMBULATORY MEDICATION CHARGE

## 2025-06-03 ENCOUNTER — PHARMACY VISIT (OUTPATIENT)
Dept: PHARMACY | Facility: CLINIC | Age: 40
End: 2025-06-03
Payer: COMMERCIAL

## 2025-06-03 PROCEDURE — RXOTC WILLOW AMBULATORY OTC CHARGE

## 2025-06-10 ENCOUNTER — PHARMACY VISIT (OUTPATIENT)
Dept: PHARMACY | Facility: CLINIC | Age: 40
End: 2025-06-10
Payer: COMMERCIAL

## 2025-06-10 DIAGNOSIS — Z76.0 MEDICATION REFILL: ICD-10-CM

## 2025-06-10 DIAGNOSIS — L70.9 ACNE, UNSPECIFIED ACNE TYPE: ICD-10-CM

## 2025-06-10 DIAGNOSIS — G89.29 OTHER CHRONIC PAIN: ICD-10-CM

## 2025-06-10 PROCEDURE — RXMED WILLOW AMBULATORY MEDICATION CHARGE

## 2025-06-10 RX ORDER — AMITRIPTYLINE HYDROCHLORIDE 10 MG/1
TABLET, FILM COATED ORAL
Qty: 180 TABLET | Refills: 3 | OUTPATIENT
Start: 2025-06-10

## 2025-06-10 RX ORDER — SPIRONOLACTONE 50 MG/1
150 TABLET, FILM COATED ORAL DAILY
Qty: 270 TABLET | Refills: 3 | Status: SHIPPED | OUTPATIENT
Start: 2025-06-10 | End: 2026-06-10

## 2025-06-10 RX ORDER — GABAPENTIN 300 MG/1
300 CAPSULE ORAL 3 TIMES DAILY
Qty: 270 CAPSULE | Refills: 1 | OUTPATIENT
Start: 2025-06-10 | End: 2025-09-08

## 2025-07-07 DIAGNOSIS — G89.29 OTHER CHRONIC PAIN: ICD-10-CM

## 2025-07-07 RX ORDER — GABAPENTIN 300 MG/1
300 CAPSULE ORAL 3 TIMES DAILY
Qty: 90 CAPSULE | Refills: 0 | Status: SHIPPED | OUTPATIENT
Start: 2025-07-07 | End: 2025-08-06

## 2025-07-09 ENCOUNTER — PHARMACY VISIT (OUTPATIENT)
Dept: PHARMACY | Facility: CLINIC | Age: 40
End: 2025-07-09
Payer: COMMERCIAL

## 2025-07-09 PROCEDURE — RXMED WILLOW AMBULATORY MEDICATION CHARGE

## 2025-07-21 PROCEDURE — RXMED WILLOW AMBULATORY MEDICATION CHARGE

## 2025-07-22 PROCEDURE — RXMED WILLOW AMBULATORY MEDICATION CHARGE

## 2025-07-23 ENCOUNTER — APPOINTMENT (OUTPATIENT)
Dept: PRIMARY CARE | Facility: CLINIC | Age: 40
End: 2025-07-23
Payer: COMMERCIAL

## 2025-07-23 VITALS
OXYGEN SATURATION: 98 % | SYSTOLIC BLOOD PRESSURE: 130 MMHG | DIASTOLIC BLOOD PRESSURE: 82 MMHG | HEART RATE: 97 BPM | HEIGHT: 63 IN | BODY MASS INDEX: 28 KG/M2 | WEIGHT: 158 LBS | TEMPERATURE: 96.6 F

## 2025-07-23 DIAGNOSIS — E55.9 VITAMIN D DEFICIENCY: ICD-10-CM

## 2025-07-23 DIAGNOSIS — N60.01 CYST OF RIGHT BREAST: ICD-10-CM

## 2025-07-23 DIAGNOSIS — K58.0 IRRITABLE BOWEL SYNDROME WITH DIARRHEA: ICD-10-CM

## 2025-07-23 DIAGNOSIS — Z02.89 ENCOUNTER FOR COMPLETION OF FORM WITH PATIENT: ICD-10-CM

## 2025-07-23 DIAGNOSIS — Z00.00 ANNUAL PHYSICAL EXAM: Primary | ICD-10-CM

## 2025-07-23 DIAGNOSIS — S91.209A AVULSION OF TOENAIL, INITIAL ENCOUNTER: ICD-10-CM

## 2025-07-23 DIAGNOSIS — M51.26 LUMBAR DISC HERNIATION: ICD-10-CM

## 2025-07-23 DIAGNOSIS — Z13.6 SCREENING FOR CARDIOVASCULAR CONDITION: ICD-10-CM

## 2025-07-23 PROBLEM — I42.9 CARDIOMYOPATHY: Status: RESOLVED | Noted: 2023-09-22 | Resolved: 2025-07-23

## 2025-07-23 PROCEDURE — 99396 PREV VISIT EST AGE 40-64: CPT | Performed by: STUDENT IN AN ORGANIZED HEALTH CARE EDUCATION/TRAINING PROGRAM

## 2025-07-23 PROCEDURE — 3008F BODY MASS INDEX DOCD: CPT | Performed by: STUDENT IN AN ORGANIZED HEALTH CARE EDUCATION/TRAINING PROGRAM

## 2025-07-23 PROCEDURE — 99213 OFFICE O/P EST LOW 20 MIN: CPT | Performed by: STUDENT IN AN ORGANIZED HEALTH CARE EDUCATION/TRAINING PROGRAM

## 2025-07-23 ASSESSMENT — PAIN SCALES - GENERAL: PAINLEVEL_OUTOF10: 3

## 2025-07-23 ASSESSMENT — PROMIS GLOBAL HEALTH SCALE
RATE_QUALITY_OF_LIFE: GOOD
RATE_AVERAGE_FATIGUE: MILD
CARRYOUT_PHYSICAL_ACTIVITIES: MOSTLY
CARRYOUT_SOCIAL_ACTIVITIES: GOOD
EMOTIONAL_PROBLEMS: SOMETIMES
RATE_GENERAL_HEALTH: GOOD
RATE_AVERAGE_PAIN: 3
RATE_MENTAL_HEALTH: GOOD
RATE_PHYSICAL_HEALTH: GOOD
RATE_SOCIAL_SATISFACTION: GOOD

## 2025-07-23 NOTE — PATIENT INSTRUCTIONS
Thank you for coming to see me today.    Go to the lab for fasting blood work, we will notify you of all results.    FMLA paperwork completed today, copy scanned into your chart and original return to you.    Podiatry referral entered today, call to schedule for toenail removal if needed.  -Can also call me if you develop any signs/symptoms of toe infection including worsening pain, redness, swelling, purulent drainage.    Right breast ultrasound ordered today, call to schedule.    Follow-up in 6 months for FMLA renewal, sooner if needed for weight management or other concerns.

## 2025-07-23 NOTE — PROGRESS NOTES
"Subjective   Leeann Mark is a 39 y.o. female who presents for fmla.    HPI:      PREVENTATIVE HEALTH CARE:    Immunizations:    Declines vaccinations per neurology recs.    Immunization History   Administered Date(s) Administered    Varicella vaccine, subcutaneous (VARIVAX) 08/12/2008       Screenings:  HIV/HCV:  negative x2 9/23/2024 - utd  Pap:  3/2021 wnl, HPV negative - utd  Mammogram:  Due for follow up R breast US  Colonoscopy:  not yet indicated, prior colonoscopy with Dr. Zhou    [facility] Paperwork:  Works as an ICU nurse.  Needs renewal of FMLA forms which need to be completed every 6 months.  FMLA is for chronic back pain and IBS symptoms.  This is a 39-year-old female presenting for completion of FMLA paperwork.  Has 2 sets of forms with her, 1 for chronic back pain with history of disc herniation.  Symptoms started May 2021, scheduled appointments every 6 months and anticipated to be lifelong.  Follows with Dr. Tobias Hardwick.  Coverage from 7/19/2025 through 7/18/2026.  Episodes 1-2 times every 30 days lasting 3 days per episode.  Office visit/treatments anticipated to be 4 times every 365 days, treatments lasting up to 2 hours.      GI related leave form  is related to IBS flareups.  Start date of March 2019, appointments for follow-up every 6 months, condition expected to be lifelong.  Start date 7/27/2025, end date 1/26/2026 episodes expected to occur 1-2 times every 30 days and lasting 3 days per episode.  Office visits from 7/27/2025 through 1/26/2026, for office visits every 365 days lasting approximately 2 hours each.    Toenail Avulsion  R second toe, occurred last week.  Nail is very attached but very painful.    ROS:    Review of systems is essentially negative for all systems except for any identified issues in HPI above.    Objective     /82   Pulse 97   Temp 35.9 °C (96.6 °F)   Ht 1.6 m (5' 3\")   Wt 71.7 kg (158 lb)   SpO2 98%   BMI 27.99 kg/m²      PHYSICAL " EXAM    GENERAL  Well-appearing, pleasant and cooperative.  No acute distress.    HEENT  HEAD:   Normocephalic.  Atraumatic.  EYES:  PERRLA.  No scleral icterus or conjunctival injection.  EARS:  Tympanic membranes visualized bilaterally without erythema, fluid, or bulging.  NECK:  No adenopathy.  No palpable thyroid enlargement or nodules.    THROAT:  Moist oropharynx without tonsillar enlargement or exudates.    LUNGS:    Clear to auscultation bilaterally.  No wheezes, rales, rhonchi.    CARDIAC:  Regular rate and rhythm.  Normal S1S2.  No murmurs/rubs/gallops.    ABDOMEN:  Soft, non-tender, non-distended.  No hepatosplenomegaly.  Normoactive bowel sounds.    MUSCULOSKELETAL:  R middle toe with mild TTP of nail bed without no significant swelling, erythema, increased warmth, no noted drainage on exam.  No gross abnormalities.   No joint swelling or erythema,.  No spinal or paraspinal tenderness to palpation.    EXTREMITIES:  No LE edema or cyanosis.      NEURO           Alert and oriented x3. No focal deficits.    PSYCH:          Affect appropriate.           Assessment/Plan   Problem List Items Addressed This Visit       Vitamin D deficiency    Currently taking 2000 units daily.         Relevant Orders    Vitamin D 25-Hydroxy,Total (for eval of Vitamin D levels)    Lumbar disc herniation    Associated with low back pain.  Follows with Dr. Hardwick in pain management.  [facility] paperwork completed today regarding this issue.         Irritable bowel syndrome with diarrhea    [facility] paperwork completed today regarding this issue.  Sx stable, using Bentyl PRN.  Seen by GI including colonoscopy previously, advised not due for colonoscopy until routine screening at age 44.          Other Visit Diagnoses         Annual physical exam    -  Primary    Relevant Orders    TSH with reflex to Free T4 if abnormal    Lipid Panel    CBC    Comprehensive Metabolic Panel      Avulsion of toenail, initial encounter        Pain with  palpation of nail bed, no erythema, bruising, warmth, or drainage.  Signs/sx of infection reviewed.  Podiatry referral for nail removal if needed.    Relevant Orders    Referral to Podiatry      Cyst of right breast        Relevant Orders    BI US breast complete right      Encounter for completion of form with patient        Two sets of FMLA paperwork completed today.  Completed forms given to staff to be scanned into chart.      Screening for cardiovascular condition        Relevant Orders    Lipid Panel            Patient Instructions   Thank you for coming to see me today.    Go to the lab for fasting blood work, we will notify you of all results.    FMLA paperwork completed today, copy scanned into your chart and original return to you.    Podiatry referral entered today, call to schedule for toenail removal if needed.  -Can also call me if you develop any signs/symptoms of toe infection including worsening pain, redness, swelling, purulent drainage.    Right breast ultrasound ordered today, call to schedule.    Follow-up in 6 months for FMLA renewal, sooner if needed for weight management or other concerns.    Counseling:   Medication education:    Education: The patient is counseled regarding potential side effects of all new medications.    Understanding:  Patient expressed understanding.    Adherence:  No barriers to adherence identified.         Miriam Loya MD

## 2025-07-24 NOTE — ASSESSMENT & PLAN NOTE
Associated with low back pain.  Follows with Dr. Hardwick in pain management.  [facility] paperwork completed today regarding this issue.

## 2025-07-24 NOTE — ASSESSMENT & PLAN NOTE
[place] paperwork completed today regarding this issue.  Sx stable, using Bentyl PRN.  Seen by GI including colonoscopy previously, advised not due for colonoscopy until routine screening at age 44.

## 2025-07-25 ENCOUNTER — PHARMACY VISIT (OUTPATIENT)
Dept: PHARMACY | Facility: CLINIC | Age: 40
End: 2025-07-25
Payer: COMMERCIAL

## 2025-07-25 ENCOUNTER — OFFICE VISIT (OUTPATIENT)
Dept: ORTHOPEDIC SURGERY | Facility: CLINIC | Age: 40
End: 2025-07-25
Payer: COMMERCIAL

## 2025-07-25 DIAGNOSIS — S91.209A NAIL AVULSION OF TOE, INITIAL ENCOUNTER: Primary | ICD-10-CM

## 2025-07-25 PROCEDURE — 99203 OFFICE O/P NEW LOW 30 MIN: CPT | Performed by: STUDENT IN AN ORGANIZED HEALTH CARE EDUCATION/TRAINING PROGRAM

## 2025-07-25 PROCEDURE — 99212 OFFICE O/P EST SF 10 MIN: CPT | Performed by: STUDENT IN AN ORGANIZED HEALTH CARE EDUCATION/TRAINING PROGRAM

## 2025-07-25 ASSESSMENT — PAIN SCALES - GENERAL: PAINLEVEL_OUTOF10: 4

## 2025-07-25 ASSESSMENT — PAIN DESCRIPTION - DESCRIPTORS: DESCRIPTORS: ACHING;STABBING

## 2025-07-25 ASSESSMENT — PAIN - FUNCTIONAL ASSESSMENT: PAIN_FUNCTIONAL_ASSESSMENT: 0-10

## 2025-07-25 NOTE — PROGRESS NOTES
ORTHOPAEDIC SURGERY OUTPATIENT PROGRESS NOTE    Chief Complaint:  Right foot pain    History Of Present Illness  Leeann Mark is a 39 y.o. female who presents for evaluation of right foot pain, referred by previous patient of mine.  Patient reports that she avulsed her nail from 7/18/2025.  The nail did not completely fall off of the second toe.  This was associated with bleeding.  She did not seek initial evaluation.  She has been discussing with colleagues and decided on pursuing formal evaluation.  She is reporting 4 out of 10 pain.  She returned to work yesterday and developed worsening pain.  She has been wrapping the toe with [employer], she plans to work tonight.  She reports no prior history of similar injury.     Past Medical History  Medical History[1]    Surgical History  Recent Surgeries in Orthopaedic Surgery            No cases to display             Social History  Social History[2]    Family History  Family History[3]     Allergies  RX Allergies[4]    Review of Systems  REVIEW OF SYSTEMS  Constitutional: no unplanned weight loss  Psychiatric: no suicidal ideation  ENT: no vision changes, no sinus problems  Pulmonary: no shortness of breath  Lymphatic: no enlarged lymph nodes  Cardiovascular: no chest pain or shortness of breath  Gastrointestinal: no stomach problems  Genitourinary: no dysuria   Skin: no rashes  Endocrine: no thyroid problems  Neurological: no headache, no numbness  Hematological: no easy bruising  Musculoskeletal: Right toenail pain     Physical Exam  PHYSICAL EXAMINATION  Constitutional Exam: well developed and well nourished  Psychiatric Exam: alert and oriented, appropriate mood and behavior  Eye Exam: EOMI  Pulmonary Exam: breathing non-labored, no apparent distress  Lymphatic exam: no appreciable lymphadenopathy in the lower extremities  Cardiovascular exam: RRR to peripheral palpation, DP pulses 2+, PT 2+, toes are pink with good capillary refill, no pitting edema  Skin exam: no  open lesions, rashes, abrasions or ulcerations  Neurological exam: sensation to light touch intact in both lower extremities in peripheral and dermatomal distributions (except for any abnormalities noted in musculoskeletal exam)    Musculoskeletal exam: Right lower extremity examination.  Patient pain localized to the second toe distally.  There is no obvious bleeding or deformity of the nail although covered by nail polish.  There is no obvious erythema, duration, fluctuance or drainage to suggest underlying infection.  The paronychia and eponychia appears grossly intact although tender to palpation.  There is no obvious deformity of the second toe.  She is able to grossly wiggle her toes with intact sensation and 2+ DP pulses.     Last Recorded Vitals  There were no vitals taken for this visit.    Laboratory Results  No results found. However, due to the size of the patient record, not all encounters were searched. Please check Results Review for a complete set of results.     Radiology Results  No new imaging at this visit.    Assessment/Plan:  39-year-old female who in my impression has right foot pain secondary to incomplete nail avulsion of the second toe of the right foot.  I have reviewed the diagnosis and treatment options extensively with the patient.  I would recommend that she continue weightbearing to her tolerance in the right lower extremity.  She should continue with her toe taping or splinting for pain and swelling control.  I would not recommend formally excising the nail as this will allow the nail fold to remain open and new nail to grow.  I discussed the possibility of nail deformity or failure of nail regrowth.  The patient will keep me apprised of her progress and I will plan to see her back on an as-needed basis.    Álvaro Darby MD, ESTELLA  Department of Orthopaedic Surgery  [facility]       [1]   Past Medical History:  Diagnosis Date    Allergic      "Angina pectoris 09/22/2023    Chronic pain     Colitis 09/22/2023    Contusion of chest 09/22/2023    Diverticulitis large intestine w/o perforation or abscess w/o bleeding 09/22/2023    Diverticulosis     Foreign body in pharynx 09/22/2023    Headache     Inflammatory bowel disease     Long COVID     Neuromuscular disorder (Multi)     Sequelae of other specified infectious and parasitic diseases 09/22/2023    Shortness of breath 09/22/2023   [2]   Social History  Socioeconomic History    Marital status: Single   Tobacco Use    Smoking status: Former     Types: Cigarettes     Passive exposure: Never    Smokeless tobacco: Never   Vaping Use    Vaping status: Never Used   Substance and Sexual Activity    Alcohol use: Never    Drug use: Never    Sexual activity: Yes     Partners: Male     Birth control/protection: None   [3]   Family History  Problem Relation Name Age of Onset    [place]'s disease Mother     [4]   Allergies  Allergen Reactions    Aripiprazole Other     tics    Azithromycin Anaphylaxis and Unknown    Lamotrigine Other     Tics, twitching    Metoclopramide Unknown and Palpitations    Metoclopramide Hcl Palpitations and Other     \"Bronchospasm\"    Shellfish Containing Products Anaphylaxis and Unknown     seafood    Sulfa (Sulfonamide Antibiotics) Anaphylaxis    Influenza Virus Vaccines Other     paralysis    Marine Algaes (Seaweed) Hives    Mirapex [Pramipexole] Other and Unknown     Vomiting      Other Blurred vision     Sulfacetamide Sod-Pred  Eye discharge    Prochlorperazine Unknown     "

## 2025-07-28 ENCOUNTER — PHARMACY VISIT (OUTPATIENT)
Dept: PHARMACY | Facility: CLINIC | Age: 40
End: 2025-07-28
Payer: COMMERCIAL

## 2025-08-08 DIAGNOSIS — F51.01 PRIMARY INSOMNIA: ICD-10-CM

## 2025-08-08 RX ORDER — ZOLPIDEM TARTRATE 10 MG/1
10 TABLET ORAL NIGHTLY PRN
Qty: 30 TABLET | Refills: 2 | Status: CANCELLED | OUTPATIENT
Start: 2025-08-08 | End: 2025-11-06

## 2025-08-11 ENCOUNTER — PHARMACY VISIT (OUTPATIENT)
Dept: PHARMACY | Facility: CLINIC | Age: 40
End: 2025-08-11
Payer: COMMERCIAL

## 2025-08-11 DIAGNOSIS — F51.01 PRIMARY INSOMNIA: ICD-10-CM

## 2025-08-11 PROCEDURE — RXMED WILLOW AMBULATORY MEDICATION CHARGE

## 2025-08-11 RX ORDER — ZOLPIDEM TARTRATE 10 MG/1
10 TABLET ORAL NIGHTLY PRN
Qty: 30 TABLET | Refills: 2 | Status: SHIPPED | OUTPATIENT
Start: 2025-08-11 | End: 2025-11-09

## 2025-08-12 ENCOUNTER — OFFICE VISIT (OUTPATIENT)
Dept: PAIN MEDICINE | Facility: CLINIC | Age: 40
End: 2025-08-12
Payer: COMMERCIAL

## 2025-08-12 VITALS
SYSTOLIC BLOOD PRESSURE: 106 MMHG | HEIGHT: 63 IN | WEIGHT: 158 LBS | DIASTOLIC BLOOD PRESSURE: 60 MMHG | BODY MASS INDEX: 28 KG/M2

## 2025-08-12 DIAGNOSIS — M47.817 LUMBOSACRAL SPONDYLOSIS WITHOUT MYELOPATHY: ICD-10-CM

## 2025-08-12 DIAGNOSIS — M54.16 LUMBAR RADICULOPATHY: Primary | ICD-10-CM

## 2025-08-12 DIAGNOSIS — G89.29 OTHER CHRONIC PAIN: ICD-10-CM

## 2025-08-12 PROCEDURE — 99212 OFFICE O/P EST SF 10 MIN: CPT

## 2025-08-12 PROCEDURE — 99214 OFFICE O/P EST MOD 30 MIN: CPT | Performed by: ANESTHESIOLOGY

## 2025-08-12 PROCEDURE — RXMED WILLOW AMBULATORY MEDICATION CHARGE

## 2025-08-12 PROCEDURE — 3008F BODY MASS INDEX DOCD: CPT | Performed by: ANESTHESIOLOGY

## 2025-08-12 RX ORDER — GABAPENTIN 300 MG/1
300 CAPSULE ORAL 2 TIMES DAILY
Qty: 180 CAPSULE | Refills: 1 | Status: SHIPPED | OUTPATIENT
Start: 2025-08-12 | End: 2025-11-10

## 2025-08-12 ASSESSMENT — ENCOUNTER SYMPTOMS
NEUROLOGICAL NEGATIVE: 1
RESPIRATORY NEGATIVE: 1
ALLERGIC/IMMUNOLOGIC NEGATIVE: 1
BACK PAIN: 1
CONSTITUTIONAL NEGATIVE: 1
EYES NEGATIVE: 1
PSYCHIATRIC NEGATIVE: 1
HEMATOLOGIC/LYMPHATIC NEGATIVE: 1
CARDIOVASCULAR NEGATIVE: 1
GASTROINTESTINAL NEGATIVE: 1
ENDOCRINE NEGATIVE: 1

## 2025-08-12 ASSESSMENT — PAIN SCALES - GENERAL
PAINLEVEL_OUTOF10: 3
PAINLEVEL_OUTOF10: 3

## 2025-08-12 ASSESSMENT — PAIN - FUNCTIONAL ASSESSMENT: PAIN_FUNCTIONAL_ASSESSMENT: 0-10

## 2025-08-12 ASSESSMENT — PAIN DESCRIPTION - DESCRIPTORS: DESCRIPTORS: ACHING

## 2025-08-15 ENCOUNTER — OFFICE VISIT (OUTPATIENT)
Dept: URGENT CARE | Age: 40
End: 2025-08-15
Payer: COMMERCIAL

## 2025-08-15 VITALS
HEIGHT: 63 IN | RESPIRATION RATE: 18 BRPM | WEIGHT: 158 LBS | TEMPERATURE: 98.1 F | BODY MASS INDEX: 28 KG/M2 | OXYGEN SATURATION: 99 % | SYSTOLIC BLOOD PRESSURE: 135 MMHG | HEART RATE: 95 BPM | DIASTOLIC BLOOD PRESSURE: 92 MMHG

## 2025-08-15 DIAGNOSIS — S05.01XA ABRASION OF RIGHT CORNEA, INITIAL ENCOUNTER: ICD-10-CM

## 2025-08-15 DIAGNOSIS — H66.92 LEFT ACUTE OTITIS MEDIA: ICD-10-CM

## 2025-08-15 DIAGNOSIS — H00.025 HORDEOLUM INTERNUM OF LEFT LOWER EYELID: Primary | ICD-10-CM

## 2025-08-15 RX ORDER — OFLOXACIN 3 MG/ML
SOLUTION/ DROPS OPHTHALMIC
Qty: 10 ML | Refills: 0 | Status: SHIPPED | OUTPATIENT
Start: 2025-08-15

## 2025-08-15 RX ORDER — AMOXICILLIN AND CLAVULANATE POTASSIUM 875; 125 MG/1; MG/1
875 TABLET, FILM COATED ORAL 2 TIMES DAILY
Qty: 14 TABLET | Refills: 0 | Status: SHIPPED | OUTPATIENT
Start: 2025-08-15 | End: 2025-08-22

## 2025-08-15 ASSESSMENT — ENCOUNTER SYMPTOMS
EYE PAIN: 1
PHOTOPHOBIA: 0
EYE REDNESS: 0
CONSTITUTIONAL NEGATIVE: 1

## 2025-08-18 ENCOUNTER — PHARMACY VISIT (OUTPATIENT)
Dept: PHARMACY | Facility: CLINIC | Age: 40
End: 2025-08-18
Payer: COMMERCIAL

## 2025-08-18 RX ORDER — AMITRIPTYLINE HYDROCHLORIDE 10 MG/1
20 TABLET, FILM COATED ORAL
Qty: 180 TABLET | Refills: 3 | OUTPATIENT
Start: 2025-08-18

## 2025-09-04 LAB
25(OH)D3+25(OH)D2 SERPL-MCNC: 31 NG/ML (ref 30–100)
ALBUMIN SERPL-MCNC: 4.6 G/DL (ref 3.6–5.1)
ALP SERPL-CCNC: 78 U/L (ref 31–125)
ALT SERPL-CCNC: 22 U/L (ref 6–29)
ANION GAP SERPL CALCULATED.4IONS-SCNC: 9 MMOL/L (CALC) (ref 7–17)
AST SERPL-CCNC: 17 U/L (ref 10–30)
BILIRUB SERPL-MCNC: 0.4 MG/DL (ref 0.2–1.2)
BUN SERPL-MCNC: 9 MG/DL (ref 7–25)
CALCIUM SERPL-MCNC: 9.3 MG/DL (ref 8.6–10.2)
CHLORIDE SERPL-SCNC: 102 MMOL/L (ref 98–110)
CHOLEST SERPL-MCNC: 208 MG/DL
CHOLEST/HDLC SERPL: 3.4 (CALC)
CO2 SERPL-SCNC: 26 MMOL/L (ref 20–32)
CREAT SERPL-MCNC: 0.77 MG/DL (ref 0.5–0.99)
EGFRCR SERPLBLD CKD-EPI 2021: 99 ML/MIN/1.73M2
ERYTHROCYTE [DISTWIDTH] IN BLOOD BY AUTOMATED COUNT: 12.3 % (ref 11–15)
GLUCOSE SERPL-MCNC: 105 MG/DL (ref 65–99)
HBA1C MFR BLD: 5.6 %
HCT VFR BLD AUTO: 44 % (ref 35–45)
HDLC SERPL-MCNC: 62 MG/DL
HGB BLD-MCNC: 14.5 G/DL (ref 11.7–15.5)
LDLC SERPL CALC-MCNC: 123 MG/DL (CALC)
MCH RBC QN AUTO: 30.2 PG (ref 27–33)
MCHC RBC AUTO-ENTMCNC: 33 G/DL (ref 32–36)
MCV RBC AUTO: 91.7 FL (ref 80–100)
NONHDLC SERPL-MCNC: 146 MG/DL (CALC)
PLATELET # BLD AUTO: 297 THOUSAND/UL (ref 140–400)
PMV BLD REES-ECKER: 10.5 FL (ref 7.5–12.5)
POTASSIUM SERPL-SCNC: 4.7 MMOL/L (ref 3.5–5.3)
PROT SERPL-MCNC: 7.4 G/DL (ref 6.1–8.1)
RBC # BLD AUTO: 4.8 MILLION/UL (ref 3.8–5.1)
SODIUM SERPL-SCNC: 137 MMOL/L (ref 135–146)
TRIGL SERPL-MCNC: 123 MG/DL
TSH SERPL-ACNC: 2.15 MIU/L
WBC # BLD AUTO: 6.1 THOUSAND/UL (ref 3.8–10.8)

## 2025-09-16 ENCOUNTER — APPOINTMENT (OUTPATIENT)
Dept: OTOLARYNGOLOGY | Facility: CLINIC | Age: 40
End: 2025-09-16
Payer: COMMERCIAL

## 2025-09-29 ENCOUNTER — APPOINTMENT (OUTPATIENT)
Dept: SLEEP MEDICINE | Facility: CLINIC | Age: 40
End: 2025-09-29
Payer: COMMERCIAL